# Patient Record
Sex: MALE | Race: WHITE | NOT HISPANIC OR LATINO | Employment: FULL TIME | URBAN - NONMETROPOLITAN AREA
[De-identification: names, ages, dates, MRNs, and addresses within clinical notes are randomized per-mention and may not be internally consistent; named-entity substitution may affect disease eponyms.]

---

## 2017-03-03 ENCOUNTER — HOSPITAL ENCOUNTER (EMERGENCY)
Facility: HOSPITAL | Age: 27
Discharge: HOME | End: 2017-03-03
Attending: EMERGENCY MEDICINE | Admitting: EMERGENCY MEDICINE

## 2017-03-03 ENCOUNTER — APPOINTMENT (OUTPATIENT)
Dept: ULTRASOUND IMAGING | Facility: HOSPITAL | Age: 27
End: 2017-03-03

## 2017-03-03 VITALS
SYSTOLIC BLOOD PRESSURE: 130 MMHG | HEIGHT: 70 IN | HEART RATE: 61 BPM | BODY MASS INDEX: 27.92 KG/M2 | WEIGHT: 195 LBS | OXYGEN SATURATION: 98 % | RESPIRATION RATE: 18 BRPM | TEMPERATURE: 97.3 F | DIASTOLIC BLOOD PRESSURE: 82 MMHG

## 2017-03-03 DIAGNOSIS — M67.452 GANGLION CYST OF LEFT GROIN: Primary | ICD-10-CM

## 2017-03-03 LAB
ANION GAP SERPL CALCULATED.3IONS-SCNC: 1.6 MMOL/L (ref 3.6–11.2)
BASOPHILS # BLD AUTO: 0.01 10*3/MM3 (ref 0–0.3)
BASOPHILS NFR BLD AUTO: 0.2 % (ref 0–2)
BILIRUB UR QL STRIP: NEGATIVE
BUN BLD-MCNC: 12 MG/DL (ref 7–21)
BUN/CREAT SERPL: 12.1 (ref 7–25)
C TRACH RRNA CVX QL NAA+PROBE: NOT DETECTED
CALCIUM SPEC-SCNC: 10 MG/DL (ref 7.7–10)
CHLORIDE SERPL-SCNC: 106 MMOL/L (ref 99–112)
CLARITY UR: CLEAR
CO2 SERPL-SCNC: 34.4 MMOL/L (ref 24.3–31.9)
COLOR UR: YELLOW
CREAT BLD-MCNC: 0.99 MG/DL (ref 0.43–1.29)
DEPRECATED RDW RBC AUTO: 39.8 FL (ref 37–54)
EOSINOPHIL # BLD AUTO: 0.16 10*3/MM3 (ref 0–0.7)
EOSINOPHIL NFR BLD AUTO: 2.8 % (ref 0–5)
ERYTHROCYTE [DISTWIDTH] IN BLOOD BY AUTOMATED COUNT: 12.5 % (ref 11.5–14.5)
GFR SERPL CREATININE-BSD FRML MDRD: 93 ML/MIN/1.73
GLUCOSE BLD-MCNC: 106 MG/DL (ref 70–110)
GLUCOSE UR STRIP-MCNC: NEGATIVE MG/DL
HCT VFR BLD AUTO: 45.7 % (ref 42–52)
HGB BLD-MCNC: 16 G/DL (ref 14–18)
HGB UR QL STRIP.AUTO: NEGATIVE
IMM GRANULOCYTES # BLD: 0.01 10*3/MM3 (ref 0–0.03)
IMM GRANULOCYTES NFR BLD: 0.2 % (ref 0–0.5)
KETONES UR QL STRIP: NEGATIVE
LEUKOCYTE ESTERASE UR QL STRIP.AUTO: NEGATIVE
LYMPHOCYTES # BLD AUTO: 2 10*3/MM3 (ref 1–3)
LYMPHOCYTES NFR BLD AUTO: 35.5 % (ref 21–51)
MCH RBC QN AUTO: 30.6 PG (ref 27–33)
MCHC RBC AUTO-ENTMCNC: 35 G/DL (ref 33–37)
MCV RBC AUTO: 87.4 FL (ref 80–94)
MONOCYTES # BLD AUTO: 0.1 10*3/MM3 (ref 0.1–0.9)
MONOCYTES NFR BLD AUTO: 1.8 % (ref 0–10)
N GONORRHOEA RRNA SPEC QL NAA+PROBE: NOT DETECTED
NEUTROPHILS # BLD AUTO: 3.35 10*3/MM3 (ref 1.4–6.5)
NEUTROPHILS NFR BLD AUTO: 59.5 % (ref 30–70)
NITRITE UR QL STRIP: NEGATIVE
OSMOLALITY SERPL CALC.SUM OF ELEC: 283.3 MOSM/KG (ref 273–305)
PH UR STRIP.AUTO: 7 [PH] (ref 5–8)
PLATELET # BLD AUTO: 144 10*3/MM3 (ref 130–400)
PMV BLD AUTO: 13.1 FL (ref 6–10)
POTASSIUM BLD-SCNC: 4.1 MMOL/L (ref 3.5–5.3)
PROT UR QL STRIP: NEGATIVE
RBC # BLD AUTO: 5.23 10*6/MM3 (ref 4.7–6.1)
SODIUM BLD-SCNC: 142 MMOL/L (ref 135–153)
SP GR UR STRIP: 1.02 (ref 1–1.03)
UROBILINOGEN UR QL STRIP: NORMAL
WBC NRBC COR # BLD: 5.63 10*3/MM3 (ref 4.5–12.5)

## 2017-03-03 PROCEDURE — 76870 US EXAM SCROTUM: CPT | Performed by: RADIOLOGY

## 2017-03-03 PROCEDURE — 81003 URINALYSIS AUTO W/O SCOPE: CPT | Performed by: PHYSICIAN ASSISTANT

## 2017-03-03 PROCEDURE — 36415 COLL VENOUS BLD VENIPUNCTURE: CPT

## 2017-03-03 PROCEDURE — 99283 EMERGENCY DEPT VISIT LOW MDM: CPT

## 2017-03-03 PROCEDURE — 85025 COMPLETE CBC W/AUTO DIFF WBC: CPT | Performed by: PHYSICIAN ASSISTANT

## 2017-03-03 PROCEDURE — 76870 US EXAM SCROTUM: CPT

## 2017-03-03 PROCEDURE — 87800 DETECT AGNT MULT DNA DIREC: CPT | Performed by: PHYSICIAN ASSISTANT

## 2017-03-03 PROCEDURE — 80048 BASIC METABOLIC PNL TOTAL CA: CPT | Performed by: PHYSICIAN ASSISTANT

## 2017-03-03 RX ORDER — IBUPROFEN 800 MG/1
800 TABLET ORAL EVERY 6 HOURS PRN
Qty: 30 TABLET | Refills: 0 | Status: ON HOLD | OUTPATIENT
Start: 2017-03-03 | End: 2022-06-11

## 2017-03-03 NOTE — ED PROVIDER NOTES
Subjective   HPI Comments: Pt states he has had a knot in left groin area for about 7-8 months now. Saw his PCP when he first noticed it and they advised it was most likely an ingrown hair. States that over the past few days, it has increased in size and has become more painful. Denies any scrotal swelling or pain, no penile discharge. Is in monogamous relationship with wife, no concerns for STD exposure.     Patient is a 26 y.o. male presenting with male genitourinary complaint.   History provided by:  Patient   used: No    Male  Problem   Presenting symptoms: no dysuria    Presenting symptoms comment:  Knot in left side of groin  Context: spontaneously    Relieved by:  None tried  Worsened by:  Nothing  Ineffective treatments:  None tried  Associated symptoms: groin pain    Associated symptoms: no diarrhea, no fever, no flank pain, no hematuria, no nausea, no penile redness, no penile swelling, no scrotal swelling and no vomiting    Risk factors: does not have multiple sexual partners, no new sexual partner and no STI exposure        Review of Systems   Constitutional: Negative for activity change and fever.   HENT: Negative for congestion and sore throat.    Eyes: Negative for pain.   Respiratory: Negative for cough, shortness of breath and wheezing.    Cardiovascular: Negative for chest pain.   Gastrointestinal: Negative for abdominal distention, diarrhea, nausea and vomiting.   Genitourinary: Negative for difficulty urinating, dysuria, flank pain, hematuria, penile swelling and scrotal swelling.   Musculoskeletal: Negative for arthralgias and myalgias.   Skin: Negative for rash and wound.   Neurological: Negative for dizziness and headaches.   Psychiatric/Behavioral: Negative for agitation.   All other systems reviewed and are negative.      History reviewed. No pertinent past medical history.    No Known Allergies    Past Surgical History   Procedure Laterality Date   • Cardiac ablation      • Cholecystectomy         History reviewed. No pertinent family history.    Social History     Social History   • Marital status:      Spouse name: N/A   • Number of children: N/A   • Years of education: N/A     Social History Main Topics   • Smoking status: Never Smoker   • Smokeless tobacco: None   • Alcohol use No   • Drug use: No   • Sexual activity: Not Asked     Other Topics Concern   • None     Social History Narrative   • None           Objective   Physical Exam   Constitutional: He is oriented to person, place, and time. He appears well-developed and well-nourished.   HENT:   Head: Normocephalic and atraumatic.   Eyes: EOM are normal. Pupils are equal, round, and reactive to light.   Neck: Normal range of motion. Neck supple.   Cardiovascular: Normal rate, regular rhythm and normal heart sounds.    Pulmonary/Chest: Effort normal and breath sounds normal.   Abdominal: Soft. Bowel sounds are normal.   Genitourinary: Testes normal.   Genitourinary Comments: Small, movable, knot in left groin.    Musculoskeletal: Normal range of motion.   Neurological: He is alert and oriented to person, place, and time.   Skin: Skin is warm and dry.   Psychiatric: He has a normal mood and affect. His behavior is normal. Judgment and thought content normal.   Nursing note and vitals reviewed.      Procedures         ED Course  ED Course                  MDM  Number of Diagnoses or Management Options     Amount and/or Complexity of Data Reviewed  Clinical lab tests: ordered and reviewed  Tests in the radiology section of CPT®: ordered and reviewed  Tests in the medicine section of CPT®: reviewed and ordered    Patient Progress  Patient progress: stable      Final diagnoses:   Ganglion cyst of left groin            TIO Alexander  03/03/17 6223

## 2017-03-05 ENCOUNTER — HOSPITAL ENCOUNTER (EMERGENCY)
Facility: HOSPITAL | Age: 27
Discharge: HOME | End: 2017-03-05
Attending: EMERGENCY MEDICINE | Admitting: EMERGENCY MEDICINE

## 2017-03-05 ENCOUNTER — APPOINTMENT (OUTPATIENT)
Dept: ULTRASOUND IMAGING | Facility: HOSPITAL | Age: 27
End: 2017-03-05

## 2017-03-05 VITALS
SYSTOLIC BLOOD PRESSURE: 110 MMHG | RESPIRATION RATE: 16 BRPM | TEMPERATURE: 97.8 F | DIASTOLIC BLOOD PRESSURE: 77 MMHG | WEIGHT: 195 LBS | HEIGHT: 70 IN | OXYGEN SATURATION: 98 % | HEART RATE: 80 BPM | BODY MASS INDEX: 27.92 KG/M2

## 2017-03-05 DIAGNOSIS — L02.214 INGUINAL ABSCESS: Primary | ICD-10-CM

## 2017-03-05 PROCEDURE — 76870 US EXAM SCROTUM: CPT | Performed by: RADIOLOGY

## 2017-03-05 PROCEDURE — 99283 EMERGENCY DEPT VISIT LOW MDM: CPT

## 2017-03-05 PROCEDURE — 76870 US EXAM SCROTUM: CPT

## 2017-03-05 RX ORDER — IBUPROFEN 800 MG/1
800 TABLET ORAL EVERY 8 HOURS PRN
Qty: 30 TABLET | Refills: 0 | Status: ON HOLD | OUTPATIENT
Start: 2017-03-05 | End: 2022-06-11

## 2017-03-05 RX ORDER — SULFAMETHOXAZOLE AND TRIMETHOPRIM 800; 160 MG/1; MG/1
1 TABLET ORAL EVERY 12 HOURS SCHEDULED
Status: DISCONTINUED | OUTPATIENT
Start: 2017-03-05 | End: 2017-03-05 | Stop reason: HOSPADM

## 2017-03-05 RX ORDER — SULFAMETHOXAZOLE AND TRIMETHOPRIM 800; 160 MG/1; MG/1
1 TABLET ORAL ONCE
Status: COMPLETED | OUTPATIENT
Start: 2017-03-05 | End: 2017-03-05

## 2017-03-05 RX ORDER — IBUPROFEN 400 MG/1
800 TABLET ORAL ONCE
Status: COMPLETED | OUTPATIENT
Start: 2017-03-05 | End: 2017-03-05

## 2017-03-05 RX ORDER — SULFAMETHOXAZOLE AND TRIMETHOPRIM 800; 160 MG/1; MG/1
1 TABLET ORAL 2 TIMES DAILY
Qty: 20 TABLET | Refills: 0 | Status: ON HOLD | OUTPATIENT
Start: 2017-03-05 | End: 2022-06-11

## 2017-03-05 RX ADMIN — IBUPROFEN 800 MG: 400 TABLET ORAL at 17:27

## 2017-03-05 RX ADMIN — SULFAMETHOXAZOLE AND TRIMETHOPRIM 160 MG: 800; 160 TABLET ORAL at 17:24

## 2017-03-05 RX ADMIN — SULFAMETHOXAZOLE AND TRIMETHOPRIM 160 MG: 800; 160 TABLET ORAL at 17:27

## 2017-03-05 NOTE — ED PROVIDER NOTES
Subjective   Patient is a 26 y.o. male presenting with abscess.   History provided by:  Patient  Abscess   Location:  Pelvis  Pelvic abscess location:  Groin  Abscess quality: painful    Red streaking: no    Duration:  5 days  Progression:  Worsening  Pain details:     Quality:  Throbbing    Severity:  Mild    Duration:  5 days    Timing:  Constant    Progression:  Worsening  Chronicity:  New  Relieved by:  Nothing  Ineffective treatments:  None tried  Associated symptoms: no fever        Review of Systems   Constitutional: Negative.  Negative for fever.   HENT: Negative.    Respiratory: Negative.    Cardiovascular: Negative.  Negative for chest pain.   Gastrointestinal: Negative.  Negative for abdominal pain.   Endocrine: Negative.    Genitourinary: Negative.  Negative for dysuria.   Skin: Negative.    Neurological: Negative.    Psychiatric/Behavioral: Negative.    All other systems reviewed and are negative.      History reviewed. No pertinent past medical history.    No Known Allergies    Past Surgical History   Procedure Laterality Date   • Cardiac ablation     • Cholecystectomy         History reviewed. No pertinent family history.    Social History     Social History   • Marital status:      Spouse name: N/A   • Number of children: N/A   • Years of education: N/A     Social History Main Topics   • Smoking status: Never Smoker   • Smokeless tobacco: None   • Alcohol use No   • Drug use: No   • Sexual activity: Not Asked     Other Topics Concern   • None     Social History Narrative           Objective   Physical Exam   Constitutional: He is oriented to person, place, and time. He appears well-developed and well-nourished. No distress.   HENT:   Head: Normocephalic and atraumatic.   Nose: Nose normal.   Eyes: Conjunctivae and EOM are normal. Pupils are equal, round, and reactive to light.   Neck: Normal range of motion. Neck supple. No JVD present. No tracheal deviation present.   Cardiovascular: Normal  rate, regular rhythm and normal heart sounds.    No murmur heard.  Pulmonary/Chest: Effort normal and breath sounds normal. No respiratory distress. He has no wheezes.   Abdominal: Soft. Bowel sounds are normal. There is no tenderness.   Musculoskeletal: Normal range of motion. He exhibits no edema or deformity.   Neurological: He is alert and oriented to person, place, and time. No cranial nerve deficit.   Skin: Skin is warm and dry. No rash noted. He is not diaphoretic. No erythema. No pallor.   Left inguinal mass.  Very little erythema.  Painful to palpation.    Psychiatric: He has a normal mood and affect. His behavior is normal. Thought content normal.   Nursing note and vitals reviewed.      Procedures         ED Course  ED Course   Comment By Time   US Scrotum VRAD:  Concern for possible necrotic mass or abscess left medial thigh, No acute scrotal abnormality.  TIO Harvey 03/05 1653                  MDM  Number of Diagnoses or Management Options  Inguinal abscess: minor     Amount and/or Complexity of Data Reviewed  Tests in the radiology section of CPT®: ordered and reviewed    Risk of Complications, Morbidity, and/or Mortality  Presenting problems: low  Diagnostic procedures: low  Management options: low    Patient Progress  Patient progress: stable      Final diagnoses:   Inguinal abscess            TIO Harvey  03/05/17 2143

## 2018-03-15 ENCOUNTER — APPOINTMENT (OUTPATIENT)
Dept: GENERAL RADIOLOGY | Facility: HOSPITAL | Age: 28
End: 2018-03-15

## 2018-03-15 ENCOUNTER — HOSPITAL ENCOUNTER (EMERGENCY)
Facility: HOSPITAL | Age: 28
Discharge: HOME | End: 2018-03-15
Attending: EMERGENCY MEDICINE | Admitting: EMERGENCY MEDICINE

## 2018-03-15 VITALS
HEIGHT: 70 IN | WEIGHT: 220 LBS | RESPIRATION RATE: 18 BRPM | SYSTOLIC BLOOD PRESSURE: 111 MMHG | DIASTOLIC BLOOD PRESSURE: 54 MMHG | TEMPERATURE: 99 F | HEART RATE: 79 BPM | OXYGEN SATURATION: 99 % | BODY MASS INDEX: 31.5 KG/M2

## 2018-03-15 DIAGNOSIS — S50.312A ABRASION OF LEFT ELBOW, INITIAL ENCOUNTER: Primary | ICD-10-CM

## 2018-03-15 LAB
6-ACETYL MORPHINE: NEGATIVE
ALBUMIN SERPL-MCNC: 4.3 G/DL (ref 3.5–5)
ALBUMIN/GLOB SERPL: 1.7 G/DL (ref 1.5–2.5)
ALP SERPL-CCNC: 65 U/L (ref 40–129)
ALT SERPL W P-5'-P-CCNC: 24 U/L (ref 10–44)
AMPHET+METHAMPHET UR QL: NEGATIVE
ANION GAP SERPL CALCULATED.3IONS-SCNC: 5.6 MMOL/L (ref 3.6–11.2)
AST SERPL-CCNC: 22 U/L (ref 10–34)
BARBITURATES UR QL SCN: NEGATIVE
BASOPHILS # BLD AUTO: 0.01 10*3/MM3 (ref 0–0.3)
BASOPHILS NFR BLD AUTO: 0.1 % (ref 0–2)
BENZODIAZ UR QL SCN: NEGATIVE
BILIRUB SERPL-MCNC: 1.7 MG/DL (ref 0.2–1.8)
BILIRUB UR QL STRIP: NEGATIVE
BUN BLD-MCNC: 10 MG/DL (ref 7–21)
BUN/CREAT SERPL: 10.2 (ref 7–25)
BUPRENORPHINE SERPL-MCNC: NEGATIVE NG/ML
CALCIUM SPEC-SCNC: 9.3 MG/DL (ref 7.7–10)
CANNABINOIDS SERPL QL: NEGATIVE
CHLORIDE SERPL-SCNC: 103 MMOL/L (ref 99–112)
CLARITY UR: CLEAR
CO2 SERPL-SCNC: 30.4 MMOL/L (ref 24.3–31.9)
COCAINE UR QL: NEGATIVE
COLOR UR: YELLOW
CREAT BLD-MCNC: 0.98 MG/DL (ref 0.43–1.29)
CRP SERPL-MCNC: 1.5 MG/DL (ref 0–0.99)
DEPRECATED RDW RBC AUTO: 39.6 FL (ref 37–54)
EOSINOPHIL # BLD AUTO: 0.1 10*3/MM3 (ref 0–0.7)
EOSINOPHIL NFR BLD AUTO: 1.1 % (ref 0–5)
ERYTHROCYTE [DISTWIDTH] IN BLOOD BY AUTOMATED COUNT: 12.8 % (ref 11.5–14.5)
ERYTHROCYTE [SEDIMENTATION RATE] IN BLOOD: 8 MM/HR (ref 0–15)
GFR SERPL CREATININE-BSD FRML MDRD: 93 ML/MIN/1.73
GLOBULIN UR ELPH-MCNC: 2.5 GM/DL
GLUCOSE BLD-MCNC: 109 MG/DL (ref 70–110)
GLUCOSE UR STRIP-MCNC: NEGATIVE MG/DL
HCT VFR BLD AUTO: 40.9 % (ref 42–52)
HGB BLD-MCNC: 14.6 G/DL (ref 14–18)
HGB UR QL STRIP.AUTO: NEGATIVE
IMM GRANULOCYTES # BLD: 0.02 10*3/MM3 (ref 0–0.03)
IMM GRANULOCYTES NFR BLD: 0.2 % (ref 0–0.5)
KETONES UR QL STRIP: NEGATIVE
LEUKOCYTE ESTERASE UR QL STRIP.AUTO: NEGATIVE
LYMPHOCYTES # BLD AUTO: 0.73 10*3/MM3 (ref 1–3)
LYMPHOCYTES NFR BLD AUTO: 7.7 % (ref 21–51)
MCH RBC QN AUTO: 31 PG (ref 27–33)
MCHC RBC AUTO-ENTMCNC: 35.7 G/DL (ref 33–37)
MCV RBC AUTO: 86.8 FL (ref 80–94)
METHADONE UR QL SCN: NEGATIVE
MONOCYTES # BLD AUTO: 0.56 10*3/MM3 (ref 0.1–0.9)
MONOCYTES NFR BLD AUTO: 5.9 % (ref 0–10)
NEUTROPHILS # BLD AUTO: 8.05 10*3/MM3 (ref 1.4–6.5)
NEUTROPHILS NFR BLD AUTO: 85 % (ref 30–70)
NITRITE UR QL STRIP: NEGATIVE
OPIATES UR QL: NEGATIVE
OSMOLALITY SERPL CALC.SUM OF ELEC: 277.2 MOSM/KG (ref 273–305)
OXYCODONE UR QL SCN: NEGATIVE
PCP UR QL SCN: NEGATIVE
PH UR STRIP.AUTO: 6.5 [PH] (ref 5–8)
PLATELET # BLD AUTO: 132 10*3/MM3 (ref 130–400)
PMV BLD AUTO: 12.1 FL (ref 6–10)
POTASSIUM BLD-SCNC: 3.7 MMOL/L (ref 3.5–5.3)
PROT SERPL-MCNC: 6.8 G/DL (ref 6–8)
PROT UR QL STRIP: NEGATIVE
RBC # BLD AUTO: 4.71 10*6/MM3 (ref 4.7–6.1)
SODIUM BLD-SCNC: 139 MMOL/L (ref 135–153)
SP GR UR STRIP: 1.02 (ref 1–1.03)
UROBILINOGEN UR QL STRIP: NORMAL
WBC NRBC COR # BLD: 9.47 10*3/MM3 (ref 4.5–12.5)

## 2018-03-15 PROCEDURE — 80053 COMPREHEN METABOLIC PANEL: CPT | Performed by: PHYSICIAN ASSISTANT

## 2018-03-15 PROCEDURE — 80307 DRUG TEST PRSMV CHEM ANLYZR: CPT | Performed by: PHYSICIAN ASSISTANT

## 2018-03-15 PROCEDURE — 87040 BLOOD CULTURE FOR BACTERIA: CPT | Performed by: PHYSICIAN ASSISTANT

## 2018-03-15 PROCEDURE — 99284 EMERGENCY DEPT VISIT MOD MDM: CPT

## 2018-03-15 PROCEDURE — 85652 RBC SED RATE AUTOMATED: CPT | Performed by: PHYSICIAN ASSISTANT

## 2018-03-15 PROCEDURE — 36415 COLL VENOUS BLD VENIPUNCTURE: CPT

## 2018-03-15 PROCEDURE — 85025 COMPLETE CBC W/AUTO DIFF WBC: CPT | Performed by: PHYSICIAN ASSISTANT

## 2018-03-15 PROCEDURE — 81003 URINALYSIS AUTO W/O SCOPE: CPT | Performed by: PHYSICIAN ASSISTANT

## 2018-03-15 PROCEDURE — 73080 X-RAY EXAM OF ELBOW: CPT | Performed by: RADIOLOGY

## 2018-03-15 PROCEDURE — 73080 X-RAY EXAM OF ELBOW: CPT

## 2018-03-15 PROCEDURE — 86140 C-REACTIVE PROTEIN: CPT | Performed by: PHYSICIAN ASSISTANT

## 2018-03-15 RX ORDER — CEPHALEXIN 500 MG/1
500 CAPSULE ORAL 2 TIMES DAILY
Qty: 14 CAPSULE | Refills: 0 | Status: SHIPPED | OUTPATIENT
Start: 2018-03-15 | End: 2018-03-15

## 2018-03-15 RX ORDER — CEPHALEXIN 500 MG/1
500 CAPSULE ORAL 2 TIMES DAILY
Qty: 14 CAPSULE | Refills: 0 | Status: SHIPPED | OUTPATIENT
Start: 2018-03-15 | End: 2018-03-22

## 2018-03-15 RX ORDER — SODIUM CHLORIDE 0.9 % (FLUSH) 0.9 %
10 SYRINGE (ML) INJECTION AS NEEDED
Status: DISCONTINUED | OUTPATIENT
Start: 2018-03-15 | End: 2018-03-15 | Stop reason: HOSPADM

## 2018-03-15 RX ADMIN — SODIUM CHLORIDE 1000 ML: 9 INJECTION, SOLUTION INTRAVENOUS at 05:27

## 2018-03-15 NOTE — ED PROVIDER NOTES
Subjective     History provided by:  Patient   used: No    Upper Extremity Issue   Location:  Elbow  Elbow location:  L elbow  Injury: yes    Time since incident:  1 week  Mechanism of injury: fall    Mechanism of injury comment:  Patient fell scraping left elbow 1 week ago. Reported this morning woke up shaking and running subjective fever.  Dislocation: no    Foreign body present:  No foreign bodies  Tetanus status:  Up to date  Prior injury to area:  No  Relieved by:  Nothing  Worsened by:  Nothing  Ineffective treatments:  None tried      Review of Systems   Constitutional: Negative.    HENT: Negative.    Eyes: Negative.    Respiratory: Negative.    Cardiovascular: Negative.    Gastrointestinal: Negative.    Endocrine: Negative.    Genitourinary: Negative.    Musculoskeletal: Negative.    Skin: Negative.    Allergic/Immunologic: Negative.    Neurological: Negative.    Hematological: Negative.    Psychiatric/Behavioral: Negative.    All other systems reviewed and are negative.      History reviewed. No pertinent past medical history.    No Known Allergies    Past Surgical History:   Procedure Laterality Date   • CARDIAC ABLATION     • CHOLECYSTECTOMY         History reviewed. No pertinent family history.    Social History     Social History   • Marital status:      Social History Main Topics   • Smoking status: Never Smoker   • Alcohol use No   • Drug use: No     Other Topics Concern   • Not on file           Objective   Physical Exam   Constitutional: He is oriented to person, place, and time. He appears well-developed and well-nourished.   HENT:   Head: Normocephalic and atraumatic.   Right Ear: External ear normal.   Left Ear: External ear normal.   Nose: Nose normal.   Mouth/Throat: Oropharynx is clear and moist.   Eyes: Conjunctivae and EOM are normal. Pupils are equal, round, and reactive to light.   Neck: Normal range of motion. Neck supple.   Cardiovascular: Normal rate, regular  rhythm, normal heart sounds and intact distal pulses.    Pulmonary/Chest: Effort normal and breath sounds normal.   Abdominal: Soft. Bowel sounds are normal.   Musculoskeletal: Normal range of motion.        Left elbow: He exhibits normal range of motion, no swelling, no effusion, no deformity and no laceration. Tenderness found.        Arms:  Neurological: He is alert and oriented to person, place, and time.   Skin: Skin is warm and dry.   Psychiatric: He has a normal mood and affect. His behavior is normal. Judgment and thought content normal.   Nursing note and vitals reviewed.      Procedures         ED Course  ED Course   Comment By Time   Discussed case with Dr. Pickard - recommends dc home with ortho f/u and keflex.  TIO Cameron 03/15 9288                  [facility]    Final diagnoses:   Abrasion of left elbow, initial encounter            TIO Cameron  03/15/18 3711

## 2018-03-15 NOTE — ED NOTES
"Patient presents to the ER this morning with patient's family due to left elbow swelling. Patient states approximately a week and a half ago he fell and obtained injury to left elbow, states that he thought it would resolve, but states he has started having swelling and running a fever within the past few days. Patient states that he woke up this morning \"shaking all over\" states that he believes he was running a fever so took 600 mg of motrin prior to coming to the ER. Patient updated on plan of care, no further needs or questions verbalized at this time. Patient is alert and oriented x4, respirations are even and unlabored, NADN, will continue to monitor.     Zoey Silveira RN  03/15/18 0549    "

## 2018-03-20 LAB
BACTERIA SPEC AEROBE CULT: NORMAL
BACTERIA SPEC AEROBE CULT: NORMAL

## 2022-06-11 ENCOUNTER — HOSPITAL ENCOUNTER (OUTPATIENT)
Facility: HOSPITAL | Age: 32
Setting detail: OBSERVATION
Discharge: HOME | End: 2022-06-12
Attending: EMERGENCY MEDICINE | Admitting: INTERNAL MEDICINE

## 2022-06-11 ENCOUNTER — APPOINTMENT (OUTPATIENT)
Dept: CARDIOLOGY | Facility: HOSPITAL | Age: 32
End: 2022-06-11

## 2022-06-11 ENCOUNTER — APPOINTMENT (OUTPATIENT)
Dept: GENERAL RADIOLOGY | Facility: HOSPITAL | Age: 32
End: 2022-06-11

## 2022-06-11 DIAGNOSIS — I21.4 NSTEMI (NON-ST ELEVATED MYOCARDIAL INFARCTION): Primary | ICD-10-CM

## 2022-06-11 PROBLEM — R53.81 OTHER MALAISE AND FATIGUE: Status: ACTIVE | Noted: 2022-06-11

## 2022-06-11 PROBLEM — R07.9 CHEST PAIN: Status: ACTIVE | Noted: 2022-06-11

## 2022-06-11 PROBLEM — R53.83 OTHER MALAISE AND FATIGUE: Status: ACTIVE | Noted: 2022-06-11

## 2022-06-11 PROBLEM — F41.1 ANXIETY STATE: Status: ACTIVE | Noted: 2022-06-11

## 2022-06-11 PROBLEM — R00.2 PALPITATIONS: Status: ACTIVE | Noted: 2022-06-11

## 2022-06-11 LAB
ALBUMIN SERPL-MCNC: 3.7 G/DL (ref 3.5–5.2)
ALBUMIN/GLOB SERPL: 1.1 G/DL
ALP SERPL-CCNC: 64 U/L (ref 39–117)
ALT SERPL W P-5'-P-CCNC: 11 U/L (ref 1–41)
ANION GAP SERPL CALCULATED.3IONS-SCNC: 10.2 MMOL/L (ref 5–15)
AST SERPL-CCNC: 25 U/L (ref 1–40)
BASOPHILS # BLD AUTO: 0.03 10*3/MM3 (ref 0–0.2)
BASOPHILS NFR BLD AUTO: 0.5 % (ref 0–1.5)
BH CV ECHO MEAS - ACS: 2.6 CM
BH CV ECHO MEAS - AO MAX PG: 7.6 MMHG
BH CV ECHO MEAS - AO MEAN PG: 4 MMHG
BH CV ECHO MEAS - AO ROOT DIAM: 3.4 CM
BH CV ECHO MEAS - AO V2 MAX: 138 CM/SEC
BH CV ECHO MEAS - AO V2 VTI: 28.5 CM
BH CV ECHO MEAS - EDV(CUBED): 125 ML
BH CV ECHO MEAS - EDV(MOD-SP4): 96.1 ML
BH CV ECHO MEAS - EF(MOD-BP): 56 %
BH CV ECHO MEAS - EF(MOD-SP4): 56.4 %
BH CV ECHO MEAS - ESV(CUBED): 39.3 ML
BH CV ECHO MEAS - ESV(MOD-SP4): 41.9 ML
BH CV ECHO MEAS - FS: 32 %
BH CV ECHO MEAS - IVS/LVPW: 0.69 CM
BH CV ECHO MEAS - IVSD: 0.9 CM
BH CV ECHO MEAS - LA DIMENSION: 2.3 CM
BH CV ECHO MEAS - LAT PEAK E' VEL: 12.9 CM/SEC
BH CV ECHO MEAS - LV DIASTOLIC VOL/BSA (35-75): 40.3 CM2
BH CV ECHO MEAS - LV MASS(C)D: 207.1 GRAMS
BH CV ECHO MEAS - LV SYSTOLIC VOL/BSA (12-30): 17.6 CM2
BH CV ECHO MEAS - LVIDD: 5 CM
BH CV ECHO MEAS - LVIDS: 3.4 CM
BH CV ECHO MEAS - LVOT AREA: 3.1 CM2
BH CV ECHO MEAS - LVOT DIAM: 2 CM
BH CV ECHO MEAS - LVPWD: 1.3 CM
BH CV ECHO MEAS - MED PEAK E' VEL: 10.1 CM/SEC
BH CV ECHO MEAS - MV A MAX VEL: 75.8 CM/SEC
BH CV ECHO MEAS - MV E MAX VEL: 79.3 CM/SEC
BH CV ECHO MEAS - MV E/A: 1.05
BH CV ECHO MEAS - PA ACC TIME: 0.13 SEC
BH CV ECHO MEAS - PA PR(ACCEL): 20.5 MMHG
BH CV ECHO MEAS - SI(MOD-SP4): 22.7 ML/M2
BH CV ECHO MEAS - SV(MOD-SP4): 54.2 ML
BH CV ECHO MEAS - TAPSE (>1.6): 2.43 CM
BH CV ECHO MEASUREMENTS AVERAGE E/E' RATIO: 6.9
BILIRUB SERPL-MCNC: 0.9 MG/DL (ref 0–1.2)
BUN SERPL-MCNC: 11 MG/DL (ref 6–20)
BUN/CREAT SERPL: 11.2 (ref 7–25)
CALCIUM SPEC-SCNC: 9 MG/DL (ref 8.6–10.5)
CHLORIDE SERPL-SCNC: 105 MMOL/L (ref 98–107)
CO2 SERPL-SCNC: 26.8 MMOL/L (ref 22–29)
CREAT SERPL-MCNC: 0.98 MG/DL (ref 0.76–1.27)
DEPRECATED RDW RBC AUTO: 40.3 FL (ref 37–54)
EGFRCR SERPLBLD CKD-EPI 2021: 106.4 ML/MIN/1.73
EOSINOPHIL # BLD AUTO: 0.07 10*3/MM3 (ref 0–0.4)
EOSINOPHIL NFR BLD AUTO: 1.2 % (ref 0.3–6.2)
ERYTHROCYTE [DISTWIDTH] IN BLOOD BY AUTOMATED COUNT: 12.8 % (ref 12.3–15.4)
FLUAV SUBTYP SPEC NAA+PROBE: NOT DETECTED
FLUBV RNA ISLT QL NAA+PROBE: NOT DETECTED
GLOBULIN UR ELPH-MCNC: 3.3 GM/DL
GLUCOSE SERPL-MCNC: 109 MG/DL (ref 65–99)
HCT VFR BLD AUTO: 40.7 % (ref 37.5–51)
HGB BLD-MCNC: 14.1 G/DL (ref 13–17.7)
HOLD SPECIMEN: NORMAL
HOLD SPECIMEN: NORMAL
IMM GRANULOCYTES # BLD AUTO: 0.03 10*3/MM3 (ref 0–0.05)
IMM GRANULOCYTES NFR BLD AUTO: 0.5 % (ref 0–0.5)
LEFT ATRIUM VOLUME INDEX: 19.3 ML/M2
LYMPHOCYTES # BLD AUTO: 2.22 10*3/MM3 (ref 0.7–3.1)
LYMPHOCYTES NFR BLD AUTO: 37.3 % (ref 19.6–45.3)
MAXIMAL PREDICTED HEART RATE: 190 BPM
MCH RBC QN AUTO: 30 PG (ref 26.6–33)
MCHC RBC AUTO-ENTMCNC: 34.6 G/DL (ref 31.5–35.7)
MCV RBC AUTO: 86.6 FL (ref 79–97)
MONOCYTES # BLD AUTO: 0.43 10*3/MM3 (ref 0.1–0.9)
MONOCYTES NFR BLD AUTO: 7.2 % (ref 5–12)
NEUTROPHILS NFR BLD AUTO: 3.17 10*3/MM3 (ref 1.7–7)
NEUTROPHILS NFR BLD AUTO: 53.3 % (ref 42.7–76)
NRBC BLD AUTO-RTO: 0 /100 WBC (ref 0–0.2)
PLATELET # BLD AUTO: 188 10*3/MM3 (ref 140–450)
PMV BLD AUTO: 11.4 FL (ref 6–12)
POTASSIUM SERPL-SCNC: 4 MMOL/L (ref 3.5–5.2)
PROT SERPL-MCNC: 7 G/DL (ref 6–8.5)
QT INTERVAL: 378 MS
QTC INTERVAL: 435 MS
RBC # BLD AUTO: 4.7 10*6/MM3 (ref 4.14–5.8)
SARS-COV-2 RNA PNL SPEC NAA+PROBE: NOT DETECTED
SODIUM SERPL-SCNC: 142 MMOL/L (ref 136–145)
STRESS TARGET HR: 162 BPM
TROPONIN T SERPL-MCNC: 0.12 NG/ML (ref 0–0.03)
TROPONIN T SERPL-MCNC: 0.16 NG/ML (ref 0–0.03)
TROPONIN T SERPL-MCNC: 0.33 NG/ML (ref 0–0.03)
WBC NRBC COR # BLD: 5.95 10*3/MM3 (ref 3.4–10.8)
WHOLE BLOOD HOLD COAG: NORMAL
WHOLE BLOOD HOLD SPECIMEN: NORMAL

## 2022-06-11 PROCEDURE — 96372 THER/PROPH/DIAG INJ SC/IM: CPT

## 2022-06-11 PROCEDURE — 87636 SARSCOV2 & INF A&B AMP PRB: CPT | Performed by: PHYSICIAN ASSISTANT

## 2022-06-11 PROCEDURE — 99204 OFFICE O/P NEW MOD 45 MIN: CPT | Performed by: INTERNAL MEDICINE

## 2022-06-11 PROCEDURE — 25010000002 ENOXAPARIN PER 10 MG: Performed by: PHYSICIAN ASSISTANT

## 2022-06-11 PROCEDURE — 84484 ASSAY OF TROPONIN QUANT: CPT | Performed by: PHYSICIAN ASSISTANT

## 2022-06-11 PROCEDURE — 36415 COLL VENOUS BLD VENIPUNCTURE: CPT

## 2022-06-11 PROCEDURE — 93306 TTE W/DOPPLER COMPLETE: CPT

## 2022-06-11 PROCEDURE — 99219 PR INITIAL OBSERVATION CARE/DAY 50 MINUTES: CPT | Performed by: PHYSICIAN ASSISTANT

## 2022-06-11 PROCEDURE — 93306 TTE W/DOPPLER COMPLETE: CPT | Performed by: INTERNAL MEDICINE

## 2022-06-11 PROCEDURE — 80053 COMPREHEN METABOLIC PANEL: CPT | Performed by: PHYSICIAN ASSISTANT

## 2022-06-11 PROCEDURE — 84484 ASSAY OF TROPONIN QUANT: CPT | Performed by: INTERNAL MEDICINE

## 2022-06-11 PROCEDURE — G0378 HOSPITAL OBSERVATION PER HR: HCPCS

## 2022-06-11 PROCEDURE — 71045 X-RAY EXAM CHEST 1 VIEW: CPT | Performed by: RADIOLOGY

## 2022-06-11 PROCEDURE — 93005 ELECTROCARDIOGRAM TRACING: CPT | Performed by: PHYSICIAN ASSISTANT

## 2022-06-11 PROCEDURE — 93010 ELECTROCARDIOGRAM REPORT: CPT | Performed by: INTERNAL MEDICINE

## 2022-06-11 PROCEDURE — C9803 HOPD COVID-19 SPEC COLLECT: HCPCS

## 2022-06-11 PROCEDURE — 85025 COMPLETE CBC W/AUTO DIFF WBC: CPT | Performed by: PHYSICIAN ASSISTANT

## 2022-06-11 PROCEDURE — 99284 EMERGENCY DEPT VISIT MOD MDM: CPT

## 2022-06-11 PROCEDURE — 71045 X-RAY EXAM CHEST 1 VIEW: CPT

## 2022-06-11 PROCEDURE — 93005 ELECTROCARDIOGRAM TRACING: CPT | Performed by: EMERGENCY MEDICINE

## 2022-06-11 RX ORDER — SODIUM CHLORIDE 0.9 % (FLUSH) 0.9 %
10 SYRINGE (ML) INJECTION AS NEEDED
Status: DISCONTINUED | OUTPATIENT
Start: 2022-06-11 | End: 2022-06-12 | Stop reason: HOSPADM

## 2022-06-11 RX ORDER — AMOXICILLIN 500 MG/1
1000 CAPSULE ORAL 2 TIMES DAILY
Status: CANCELLED | OUTPATIENT
Start: 2022-06-11 | End: 2022-06-18

## 2022-06-11 RX ORDER — ENOXAPARIN SODIUM 100 MG/ML
1 INJECTION SUBCUTANEOUS EVERY 12 HOURS
Status: DISCONTINUED | OUTPATIENT
Start: 2022-06-12 | End: 2022-06-12 | Stop reason: HOSPADM

## 2022-06-11 RX ORDER — AMOXICILLIN 500 MG/1
1000 CAPSULE ORAL 2 TIMES DAILY
COMMUNITY
Start: 2022-06-07 | End: 2022-06-17

## 2022-06-11 RX ORDER — ATORVASTATIN CALCIUM 40 MG/1
80 TABLET, FILM COATED ORAL NIGHTLY
Status: DISCONTINUED | OUTPATIENT
Start: 2022-06-11 | End: 2022-06-12 | Stop reason: HOSPADM

## 2022-06-11 RX ORDER — AMOXICILLIN 500 MG/1
1000 CAPSULE ORAL 2 TIMES DAILY
Status: DISCONTINUED | OUTPATIENT
Start: 2022-06-11 | End: 2022-06-12 | Stop reason: HOSPADM

## 2022-06-11 RX ORDER — AMOXICILLIN 500 MG/1
1000 CAPSULE ORAL 2 TIMES DAILY
Status: DISCONTINUED | OUTPATIENT
Start: 2022-06-11 | End: 2022-06-11

## 2022-06-11 RX ORDER — ONDANSETRON 2 MG/ML
4 INJECTION INTRAMUSCULAR; INTRAVENOUS EVERY 6 HOURS PRN
Status: DISCONTINUED | OUTPATIENT
Start: 2022-06-11 | End: 2022-06-12 | Stop reason: HOSPADM

## 2022-06-11 RX ORDER — ACETAMINOPHEN 325 MG/1
650 TABLET ORAL EVERY 6 HOURS PRN
Status: DISCONTINUED | OUTPATIENT
Start: 2022-06-11 | End: 2022-06-12 | Stop reason: HOSPADM

## 2022-06-11 RX ORDER — ASPIRIN 81 MG/1
324 TABLET, CHEWABLE ORAL ONCE
Status: COMPLETED | OUTPATIENT
Start: 2022-06-11 | End: 2022-06-11

## 2022-06-11 RX ORDER — ENOXAPARIN SODIUM 100 MG/ML
1 INJECTION SUBCUTANEOUS ONCE
Status: COMPLETED | OUTPATIENT
Start: 2022-06-11 | End: 2022-06-11

## 2022-06-11 RX ORDER — ASPIRIN 81 MG/1
81 TABLET ORAL DAILY
Status: DISCONTINUED | OUTPATIENT
Start: 2022-06-12 | End: 2022-06-12 | Stop reason: HOSPADM

## 2022-06-11 RX ORDER — SODIUM CHLORIDE 0.9 % (FLUSH) 0.9 %
10 SYRINGE (ML) INJECTION EVERY 12 HOURS SCHEDULED
Status: DISCONTINUED | OUTPATIENT
Start: 2022-06-11 | End: 2022-06-12 | Stop reason: HOSPADM

## 2022-06-11 RX ADMIN — ATORVASTATIN CALCIUM 80 MG: 40 TABLET, FILM COATED ORAL at 21:43

## 2022-06-11 RX ADMIN — NITROGLYCERIN 1 INCH: 20 OINTMENT TOPICAL at 21:43

## 2022-06-11 RX ADMIN — ASPIRIN 324 MG: 81 TABLET, CHEWABLE ORAL at 10:17

## 2022-06-11 RX ADMIN — ENOXAPARIN SODIUM 110 MG: 60 INJECTION SUBCUTANEOUS at 13:28

## 2022-06-11 RX ADMIN — METOPROLOL TARTRATE 12.5 MG: 25 TABLET, FILM COATED ORAL at 21:43

## 2022-06-11 RX ADMIN — AMOXICILLIN 1000 MG: 500 CAPSULE ORAL at 21:43

## 2022-06-11 RX ADMIN — ENOXAPARIN SODIUM 110 MG: 60 INJECTION SUBCUTANEOUS at 21:42

## 2022-06-11 RX ADMIN — Medication 10 ML: at 19:37

## 2022-06-11 NOTE — ED PROVIDER NOTES
Subjective   32-year-old white male presents secondary to chest discomfort.  Patient states that around 830 he was already awake and was getting ready to go to get his haircut when he started developing some discomfort between his shoulder blades.  He states this was a drawing sensation.  He subsequently developed some he thought might have been indigestion with chest discomfort.  He states he did not have any shortness of breath.  He states that he did get slightly clammy at the time.  This resolved after period of time.  He states he has a slight discomfort since then.  He denies any history of coronary blockages.  He does have a history of arrhythmia/V. tach which required an ablation approximately 7 years ago.  He states that he is not had any problems since then.  He denies any history of hypertension hyperlipidemia diabetes.  He voices no other complaints at this time.  He states that his symptoms improved after taking an antacid.          Review of Systems   Constitutional: Negative.  Negative for fever.   HENT: Negative.    Respiratory: Positive for chest tightness. Negative for shortness of breath.    Cardiovascular: Negative.  Negative for chest pain.   Gastrointestinal: Negative.  Negative for abdominal pain.   Endocrine: Negative.    Genitourinary: Negative.  Negative for dysuria.   Skin: Negative.    Neurological: Negative.    Psychiatric/Behavioral: Negative.    All other systems reviewed and are negative.      Past Medical History:   Diagnosis Date   • Anxiety state 6/11/2022   • Palpitations 6/11/2022       No Known Allergies    Past Surgical History:   Procedure Laterality Date   • CARDIAC ABLATION     • CHOLECYSTECTOMY         Family History   Problem Relation Age of Onset   • Cancer Mother         Breast CA   • Diabetes Father        Social History     Socioeconomic History   • Marital status:    Tobacco Use   • Smoking status: Never Smoker   Substance and Sexual Activity   • Alcohol use:  No   • Drug use: No           Objective   Physical Exam  Vitals and nursing note reviewed.   Constitutional:       General: He is not in acute distress.     Appearance: He is well-developed. He is not diaphoretic.   HENT:      Head: Normocephalic and atraumatic.      Right Ear: External ear normal.      Left Ear: External ear normal.      Nose: Nose normal.   Eyes:      Conjunctiva/sclera: Conjunctivae normal.      Pupils: Pupils are equal, round, and reactive to light.   Neck:      Vascular: No JVD.      Trachea: No tracheal deviation.   Cardiovascular:      Rate and Rhythm: Normal rate and regular rhythm.      Heart sounds: Normal heart sounds. No murmur heard.  Pulmonary:      Effort: Pulmonary effort is normal. No respiratory distress.      Breath sounds: Normal breath sounds. No wheezing.   Abdominal:      General: Bowel sounds are normal.      Palpations: Abdomen is soft.      Tenderness: There is no abdominal tenderness.   Musculoskeletal:         General: No deformity. Normal range of motion.      Cervical back: Normal range of motion and neck supple.   Skin:     General: Skin is warm and dry.      Coloration: Skin is not pale.      Findings: No erythema or rash.   Neurological:      Mental Status: He is alert and oriented to person, place, and time.      Cranial Nerves: No cranial nerve deficit.   Psychiatric:         Behavior: Behavior normal.         Thought Content: Thought content normal.         Procedures           ED Course  ED Course as of 06/11/22 1618   Sat Jun 11, 2022   1021 ECG 12 Lead  Normal sinus rhythm.  Rate 80.  Normal axis.  Normal QT interval.  No hypertrophic changes.  No acute ischemic changes.  Normal EKG.  Interpreted by me. [BC]   1152 Tried to admit patient however patient does not wish to be admitted at this time and is thinking about signing out AGAINST MEDICAL ADVICE.  Had lengthy discussions in regards to heart attack and death cardiomyopathy etc.  I am trying to get someone  from finances to be able to talk to him in regards to not having insurance. [JI]   1301 Still awaiting second troponin [JI]   1319 Reviewed with Dr Demetris Posadas and asa. Admit to hospitalist.  [JI]   1325 Dr Malik contacting the hospitalist. [JI]   1345 Accepted by Dr Behbahani [JI]      ED Course User Index  [BC] North Malik MD  [JI] Dex Stack PA                                                 MDM  Number of Diagnoses or Management Options  NSTEMI (non-ST elevated myocardial infarction) (HCC): new and requires workup     Amount and/or Complexity of Data Reviewed  Clinical lab tests: reviewed and ordered  Tests in the radiology section of CPT®: ordered and reviewed  Tests in the medicine section of CPT®: reviewed and ordered  Discuss the patient with other providers: yes    Risk of Complications, Morbidity, and/or Mortality  Presenting problems: high  Diagnostic procedures: high  Management options: high        Final diagnoses:   NSTEMI (non-ST elevated myocardial infarction) (HCC)       ED Disposition  ED Disposition     ED Disposition   Decision to Admit    Condition   --    Comment   Level of Care: Telemetry [5]   Diagnosis: Chest pain [314529]               No follow-up provider specified.       Medication List      No changes were made to your prescriptions during this visit.          Dex Stack PA  06/11/22 8526

## 2022-06-11 NOTE — PROGRESS NOTES
Discharge Planning Assessment   [facility]     Patient Name: Gaston Hanna  MRN: [id number]  Today's Date: 6/11/2022    Admit Date: 6/11/2022     Discharge Needs Assessment     Row Name 06/11/22 1349       Living Environment    People in Home spouse    Current Living Arrangements home    Primary Care Provided by self    Family Caregiver if Needed spouse    Quality of Family Relationships helpful;involved;supportive       Resource/Environmental Concerns    Resource/Environmental Concerns financial    Financial Concerns insurance, none       Transition Planning    Patient/Family Anticipates Transition to home with family    Patient/Family Anticipated Services at Transition none    Transportation Anticipated car, drives self       Discharge Needs Assessment    Readmission Within the Last 30 Days no previous admission in last 30 days    Equipment Currently Used at Home none    Concerns to be Addressed financial/insurance;discharge planning    Anticipated Changes Related to Illness none    Equipment Needed After Discharge none               Discharge Plan     Row Name 06/11/22 1349       Plan    Plan Pt lives at home with spouse Tracy and plans to return home at discharge, pt drives himself. Pt states he does not currently have a pcp, he uses [facility] and does not have insurance. Registration was asked to speak to pt about options. Pt is independent he does not use any dme, home health or home o2.    Patient/Family in Agreement with Plan yes              Continued Care and Services - Admitted Since 6/11/2022    Coordination has not been started for this encounter.          Demographic Summary     Row Name 06/11/22 1349       General Information    Admission Type observation    Arrived From home    Referral Source emergency department    Reason for Consult discharge planning               Functional Status     Row Name 06/11/22 1349       Functional Status    Usual Activity Tolerance good    Current  Activity Tolerance good               Psychosocial    No documentation.                Abuse/Neglect    No documentation.                Legal    No documentation.                Substance Abuse    No documentation.                Patient Forms    No documentation.                   Irais Barros RN

## 2022-06-11 NOTE — PLAN OF CARE
Goal Outcome Evaluation:  Plan of Care Reviewed With: patient           Outcome Evaluation: Pt is a new admit from the ER, report recieved from [facility]. AOx4, VSS, no complants or s/s of acute distress noted. Pt educated on need for tests today and in the AM, pt verbalizes understanding. IV access and telemetry monitoring patent and maintained, will continue to monitor.

## 2022-06-11 NOTE — H&P
[facility] Medicine Services  History & Physical    Patient Identification:  Name:  Gaston Hanna  Age:  32 y.o.  Sex:  male  :  1990  MRN:  [id number]   Visit Number:  [id number]  Admit Date: 2022   Primary Care Physician:  Provider, No Known    Subjective     Chief complaint: Chest discomfort    History of presenting illness:      Gaston Hanna is a 32 y.o. male with past medical history significant for cardiac ablation due to prior arrhythmia/Vtach.     Mr. Hanna presented to the ED of [facility] on this date for further evaluation of chest discomfort reporting chest/back shoulder pain since this AM with cold sweats and chest tightness. Upon arrival to the ED, vital signs were T 97.8F, pulse 74, RR 16, and BP was 134/91 with EKG with NSR. Initial troponin T was elevated at 0.115 with repeat found to be 0.164.        Mr. Hanna reported onset of chest discomfort to be around 830 AM this morning with discomfort between his shoulder blades and development of what he thought was indigestin.   He reported clamminess during period of chest pressure that was relieved as the pain resolved. He reports symptoms resolved with antacid therapy.      Mr. Hanna reports he was evaluated on 22 at [facility] for sore throat.  He reports he was swabbed for strep and found to be positive.  He started Amoxicillin on said date and has been taking it for a total of 5 days.  He reports chest pain did not start until this AM.  He denies dyspnea, fever, and chills.  He reports sore throat has improved. He denies nausea, vomiting, chills, fatigue.      He denies known significant family history of cardiovascular disease as well as known personal history of cardiovascular disease.         ---------------------------------------------------------------------------------------------------------------------   Review of Systems   Constitutional: Positive for fatigue. Negative for  activity change.   HENT: Negative for congestion and drooling.    Eyes: Negative for pain and discharge.   Respiratory: Negative for cough, shortness of breath and wheezing.    Cardiovascular: Positive for chest pain. Negative for palpitations and leg swelling.   Gastrointestinal: Negative for abdominal distention, diarrhea, rectal pain and vomiting.   Endocrine: Negative for cold intolerance.   Genitourinary: Negative for difficulty urinating and enuresis.   Musculoskeletal: Negative for arthralgias and gait problem.   Skin: Negative for color change and pallor.   Allergic/Immunologic: Negative for environmental allergies and food allergies.   Neurological: Negative for dizziness and headaches.   Hematological: Negative for adenopathy. Does not bruise/bleed easily.   Psychiatric/Behavioral: Negative for agitation and confusion.        ---------------------------------------------------------------------------------------------------------------------   Past Medical History:   Diagnosis Date   • Anxiety state 6/11/2022   • Palpitations 6/11/2022     Past Surgical History:   Procedure Laterality Date   • CARDIAC ABLATION     • CHOLECYSTECTOMY       Family History   Problem Relation Age of Onset   • Cancer Mother         Breast CA   • Diabetes Father      Social History     Socioeconomic History   • Marital status:    Tobacco Use   • Smoking status: Never Smoker   Substance and Sexual Activity   • Alcohol use: No   • Drug use: No     ---------------------------------------------------------------------------------------------------------------------   Allergies:  Patient has no known allergies.  ---------------------------------------------------------------------------------------------------------------------   Home medications:    Medications below are reported home medications pulling from within the system; at this time, these medications have not been reconciled unless otherwise specified and are in the  verification process for further verifcation as current home medications.  (Not in a hospital admission)      Hospital Scheduled Meds:          Current listed hospital scheduled medications may not yet reflect those currently placed in orders that are signed and held awaiting patient's arrival to floor.   ---------------------------------------------------------------------------------------------------------------------     Objective     Vital Signs:  Temp:  [97.8 °F (36.6 °C)] 97.8 °F (36.6 °C)  Heart Rate:  [65-75] 70  Resp:  [16] 16  BP: (131-144)/() 139/113      06/11/22  1005   Weight: 113 kg (250 lb)     Body mass index is 34.87 kg/m².  ---------------------------------------------------------------------------------------------------------------------       Physical Exam  Vitals and nursing note reviewed.   Constitutional:       General: He is awake.      Appearance: Normal appearance. He is well-developed.   HENT:      Head: Normocephalic and atraumatic.      Mouth/Throat:      Mouth: Mucous membranes are moist.   Eyes:      General: Lids are normal.      Conjunctiva/sclera:      Right eye: Right conjunctiva is not injected.      Left eye: Left conjunctiva is not injected.   Cardiovascular:      Rate and Rhythm: Normal rate and regular rhythm.      Heart sounds: S1 normal and S2 normal.   Pulmonary:      Effort: No tachypnea or bradypnea.      Breath sounds: No decreased breath sounds, wheezing, rhonchi or rales.   Abdominal:      General: Bowel sounds are normal.      Palpations: Abdomen is soft.      Tenderness: There is no abdominal tenderness.   Musculoskeletal:      Right lower leg: No swelling. No edema.      Left lower leg: No swelling. No edema.   Skin:     General: Skin is warm and moist.   Neurological:      Mental Status: He is alert and oriented to person, place, and time.      GCS: GCS eye subscore is 4. GCS verbal subscore is 5. GCS motor subscore is 6.   Psychiatric:         Attention  and Perception: Attention normal.         Mood and Affect: Mood normal.         Speech: Speech normal.         Behavior: Behavior is cooperative.               ---------------------------------------------------------------------------------------------------------------------  EKG:                        I have personally looked at both the EKG and the telemetry strips.  ---------------------------------------------------------------------------------------------------------------------   Results from last 7 days   Lab Units 06/11/22  1023   WBC 10*3/mm3 5.95   HEMOGLOBIN g/dL 14.1   HEMATOCRIT % 40.7   MCV fL 86.6   MCHC g/dL 34.6   PLATELETS 10*3/mm3 188         Results from last 7 days   Lab Units 06/11/22  1023   SODIUM mmol/L 142   POTASSIUM mmol/L 4.0   CHLORIDE mmol/L 105   CO2 mmol/L 26.8   BUN mg/dL 11   CREATININE mg/dL 0.98   CALCIUM mg/dL 9.0   GLUCOSE mg/dL 109*   ALBUMIN g/dL 3.70   BILIRUBIN mg/dL 0.9   ALK PHOS U/L 64   AST (SGOT) U/L 25   ALT (SGPT) U/L 11   Estimated Creatinine Clearance: 140.9 mL/min (by C-G formula based on SCr of 0.98 mg/dL).  No results found for: AMMONIA  Results from last 7 days   Lab Units 06/11/22  1221 06/11/22  1023   TROPONIN T ng/mL 0.164* 0.115*         Lab Results   Component Value Date    HGBA1C 4.5 11/24/2015     Lab Results   Component Value Date    TSH 1.812 10/14/2015     No results found for: PREGTESTUR, PREGSERUM, HCG, HCGQUANT  Pain Management Panel     Pain Management Panel Latest Ref Rng & Units 3/15/2018 10/12/2015    AMPHETAMINES SCREEN, URINE Negative Negative Negative    BARBITURATES SCREEN Negative Negative Negative    BENZODIAZEPINE SCREEN, URINE Negative Negative Negative    BUPRENORPHINEUR Negative Negative -    COCAINE SCREEN, URINE Negative Negative Negative    METHADONE SCREEN, URINE Negative Negative Negative        No results found for: BLOODCX  No results found for: URINECX  No results found for: WOUNDCX  No results found for: STOOLCX       ---------------------------------------------------------------------------------------------------------------------  Imaging Results (Last 7 Days)     Procedure Component Value Units Date/Time    XR Chest 1 View [[id number]] Collected: 06/11/22 1342     Updated: 06/11/22 1356    Narrative:      XR CHEST 1 VW-     CLINICAL INDICATION: chest pain        COMPARISON: 02/29/2016      TECHNIQUE: Single frontal view of the chest.     FINDINGS:      LUNGS: Lungs are adequately aerated.      HEART AND MEDIASTINUM: Heart and mediastinal contours are unremarkable        SKELETON: Bony and soft tissue structures are unremarkable.             Impression:      No radiographic evidence of acute cardiac or pulmonary disease.     This report was finalized on 6/11/2022 1:42 PM by Dr. Anival Alexandra MD.             Cultures:  No results found for: BLOODCX, URINECX, WOUNDCX, MRSACX, RESPCX, STOOLCX    Last echocardiogram:          I have personally reviewed the above radiology images and read the final radiology report on 06/11/22  ---------------------------------------------------------------------------------------------------------------------  Assessment / Plan     Active Hospital Problems    Diagnosis  POA   • Chest pain [R07.9]  Yes       ASSESSMENT/PLAN:    -Chest pain r/o MI:   -Elevated Troponin T:   -Concern for myocarditis with recently diagnosed Strep pharyngitis:    • Continuous cardiac monitoring.   • Echocardiogram has been ordered for further structural evaluation.   • Consult cardiology for further evaluation with intial SQ Lovenox dosed in the ED.   • Full dose Lovenox per ED discussion with Cardiology.   • Aspirin on board.   • Order AM EKG.   • Order request placed record from [facility] visit from earlier this week and recent positive strep swab.   • Troponin T q6 ordered x3.     -Recently diagnosed Strep pharyngitis:   · Strep swab record requested.   · Review home medication regimen.   · Today is day #5  of amoxicillin.     -Hx cardiac arrhythmia s/p ablation:   · Continuous cardiac monitoring.     -Obesity by BMi 34.87 kg/m2:   · Supportive care.     ----------  -DVT prophylaxis: SQ Lovenox to serve  -Activity: Up with assist  -Expected length of stay:   OBSERVATION status; however, if further evaluation or treatment plans warrant, status may change.  Based upon current information, I predict patient's care encounter to be less than or equal to 2 midnights  -Disposition: Return home at discharge    High risk secondary to chest pain with elevated Troponin T and concern for possible Strep pharyngitis induced myocarditis        Ijeoma Perez PA-C  06/11/22  14:25 EDT  Pager # [phone]  ---------------------------------------------------------------------------------------------------------------------

## 2022-06-11 NOTE — CONSULTS
Consults  Date of Admit: 6/11/2022  Date of Consult: 06/11/22  No ref. provider found  Gaston Hanna  1990  Consulting Physician: Dr. Broderick    Cardiology consultation    Reason for consultation: elevated troponin  Assessment:  1. Chest pain elevated cardiac enzymes  2. History of ventricular tachycardia s/p ablation  3. Acute strep pharyngitis      Recommendations:  1. Cardiac.  Patient has been having some discomfort in the chest off and on sometimes he feels his heart doing a flip-flop in the chest.  Patient came in due to worsening anginal symptoms.  He had recent diagnosis of strep throat and is taking antibiotics also.  2. Patient troponins were minimally elevated.  EKG does not show any acute ischemic changes.  Will get echocardiogram for any new wall motion abnormality.  Need to rule out any myocardial involvement from viral disease.  3. May get a stress test on him.  Anticoagulation will be advised beta-blockers will be advised lipids will be checked.  Aggressive risk factor modification coronary artery disease to continue.  Thank you for the consultation.      History of Present Illness    Subjective     Chief Complaint   Patient presents with   • Chest Pain       Gaston Hanna is a 32 y.o. male with past medical history significant for history of nonsustained ventricular tachycardia status post ablation by Dr. Oliveros on 10/5/2015 and tobacco abuse.  He does deny history of diabetes mellitus.  Gaston reports that he presented to [facility] emergency department after developing chest pain this morning around 8:00 that did radiate into his shoulder.  That he described as a sharp sensation that lasted for a few minutes at a time.  Of note he was recently diagnosed with acute streptococcal pharyngitis just a few days ago and was initiated on antibiotics.  Upon arrival in the emergency room he was found to have an elevated troponin initially at 0.115 with redraw 2 hours  later being 0.164.  He denies any known family history of premature coronary artery disease.  He also reports he does still feel occasional episodes of palpitations that he describes as PVCs.          Past Medical History:   Diagnosis Date   • Anxiety state 6/11/2022   • Palpitations 6/11/2022     Past Surgical History:   Procedure Laterality Date   • CARDIAC ABLATION     • CHOLECYSTECTOMY       Family History   Problem Relation Age of Onset   • Cancer Mother         Breast CA   • Diabetes Father      Social History     Tobacco Use   • Smoking status: Never Smoker   Substance Use Topics   • Alcohol use: No   • Drug use: No     (Not in a hospital admission)    Allergies:  Patient has no known allergies.      Current Facility-Administered Medications:   •  sodium chloride 0.9 % flush 10 mL, 10 mL, Intravenous, PRN, Dex Stack PA    Current Outpatient Medications:   •  ibuprofen (ADVIL,MOTRIN) 800 MG tablet, Take 1 tablet by mouth Every 6 (Six) Hours As Needed for mild pain (1-3) or moderate pain (4-6)., Disp: 30 tablet, Rfl: 0  •  ibuprofen (ADVIL,MOTRIN) 800 MG tablet, Take 1 tablet by mouth Every 8 (Eight) Hours As Needed for mild pain (1-3)., Disp: 30 tablet, Rfl: 0  •  sulfamethoxazole-trimethoprim (BACTRIM DS,SEPTRA DS) 800-160 MG per tablet, Take 1 tablet by mouth 2 (Two) Times a Day., Disp: 20 tablet, Rfl: 0    Review of Systems   Constitutional: Negative for appetite change and diaphoresis.   Respiratory: Positive for chest tightness and shortness of breath.    Cardiovascular: Positive for chest pain and palpitations.   Gastrointestinal: Negative for anal bleeding and blood in stool.   Genitourinary: Negative for dysuria and hematuria.   Musculoskeletal: Negative for arthralgias and back pain.   Skin: Negative for color change and pallor.   Neurological: Negative for dizziness and syncope.   Hematological: Negative for adenopathy. Does not bruise/bleed easily.   Psychiatric/Behavioral: Negative for  agitation and behavioral problems.         Objective      Vital Signs  Temp:  [97.8 °F (36.6 °C)] 97.8 °F (36.6 °C)  Heart Rate:  [65-75] 70  Resp:  [16] 16  BP: (131-144)/() 139/113  Body mass index is 34.87 kg/m².  No intake or output data in the 24 hours ending 06/11/22 1437    Physical Exam  Constitutional:       Appearance: Normal appearance.   HENT:      Head: Normocephalic and atraumatic.   Eyes:      General:         Right eye: No discharge.         Left eye: No discharge.      Pupils: Pupils are equal, round, and reactive to light.   Cardiovascular:      Rate and Rhythm: Normal rate and regular rhythm.   Pulmonary:      Breath sounds: Normal breath sounds.   Abdominal:      General: Abdomen is flat.      Palpations: Abdomen is soft.   Musculoskeletal:         General: No swelling or tenderness.   Skin:     General: Skin is warm and dry.   Neurological:      General: No focal deficit present.      Mental Status: He is alert and oriented to person, place, and time.   Psychiatric:         Mood and Affect: Mood normal.         Behavior: Behavior normal.           Results Review:   I reviewed the patient's new clinical results.  Results from last 7 days   Lab Units 06/11/22  1221 06/11/22  1023   TROPONIN T ng/mL 0.164* 0.115*     Results from last 7 days   Lab Units 06/11/22  1023   WBC 10*3/mm3 5.95   HEMOGLOBIN g/dL 14.1   PLATELETS 10*3/mm3 188     Results from last 7 days   Lab Units 06/11/22  1023   SODIUM mmol/L 142   POTASSIUM mmol/L 4.0   CHLORIDE mmol/L 105   CO2 mmol/L 26.8   BUN mg/dL 11   CREATININE mg/dL 0.98   CALCIUM mg/dL 9.0   GLUCOSE mg/dL 109*   ALT (SGPT) U/L 11   AST (SGOT) U/L 25     Lab Results   Component Value Date    INR 1.04 11/24/2015     Lab Results   Component Value Date    MG 1.9 11/24/2015    MG 2.1 10/14/2015    MG 2.0 10/13/2015     Lab Results   Component Value Date    TSH 1.812 10/14/2015    CHLPL 133 10/05/2015    TRIG 69 10/05/2015    HDL 41 (L) 10/05/2015    LDL 78  10/05/2015      No results found for: BNP    EKG:     Imaging Results (Last 72 Hours)     Procedure Component Value Units Date/Time    XR Chest 1 View [[id number]] Collected: 06/11/22 1342     Updated: 06/11/22 1356    Narrative:      XR CHEST 1 VW-     CLINICAL INDICATION: chest pain        COMPARISON: 02/29/2016      TECHNIQUE: Single frontal view of the chest.     FINDINGS:      LUNGS: Lungs are adequately aerated.      HEART AND MEDIASTINUM: Heart and mediastinal contours are unremarkable        SKELETON: Bony and soft tissue structures are unremarkable.             Impression:      No radiographic evidence of acute cardiac or pulmonary disease.     This report was finalized on 6/11/2022 1:42 PM by Dr. Anival Alexandra MD.              Thank you very much for asking us to be involved in this patient's care.  We will follow along with you.    Samuel Garcia PA-C   06/11/22  14:37 EDT  Electronically signed by Ray Broderick MD, 06/11/22, 3:54 PM EDT.

## 2022-06-12 ENCOUNTER — APPOINTMENT (OUTPATIENT)
Dept: NUCLEAR MEDICINE | Facility: HOSPITAL | Age: 32
End: 2022-06-12

## 2022-06-12 ENCOUNTER — APPOINTMENT (OUTPATIENT)
Dept: CARDIOLOGY | Facility: HOSPITAL | Age: 32
End: 2022-06-12

## 2022-06-12 VITALS
RESPIRATION RATE: 18 BRPM | HEART RATE: 73 BPM | OXYGEN SATURATION: 98 % | SYSTOLIC BLOOD PRESSURE: 151 MMHG | DIASTOLIC BLOOD PRESSURE: 82 MMHG | BODY MASS INDEX: 37.38 KG/M2 | HEIGHT: 71 IN | TEMPERATURE: 98.3 F | WEIGHT: 267 LBS

## 2022-06-12 LAB
BH CV NUCLEAR PRIOR STUDY: 2
BH CV REST NUCLEAR ISOTOPE DOSE: 9.9 MCI
BH CV STRESS BP STAGE 1: NORMAL
BH CV STRESS BP STAGE 2: NORMAL
BH CV STRESS BP STAGE 3: NORMAL
BH CV STRESS DURATION MIN STAGE 1: 3
BH CV STRESS DURATION MIN STAGE 2: 3
BH CV STRESS DURATION MIN STAGE 3: 3
BH CV STRESS DURATION SEC STAGE 1: 0
BH CV STRESS DURATION SEC STAGE 2: 0
BH CV STRESS DURATION SEC STAGE 3: 0
BH CV STRESS GRADE STAGE 1: 10
BH CV STRESS GRADE STAGE 2: 12
BH CV STRESS GRADE STAGE 3: 14
BH CV STRESS HR STAGE 1: 97
BH CV STRESS HR STAGE 2: 116
BH CV STRESS HR STAGE 3: 161
BH CV STRESS METS STAGE 1: 5
BH CV STRESS METS STAGE 2: 7.5
BH CV STRESS METS STAGE 3: 10
BH CV STRESS NUCLEAR ISOTOPE DOSE: 30.1 MCI
BH CV STRESS PROTOCOL 1: NORMAL
BH CV STRESS RECOVERY BP: NORMAL MMHG
BH CV STRESS RECOVERY HR: 93 BPM
BH CV STRESS SPEED STAGE 1: 1.7
BH CV STRESS SPEED STAGE 2: 2.5
BH CV STRESS SPEED STAGE 3: 3.4
BH CV STRESS STAGE 1: 1
BH CV STRESS STAGE 2: 2
BH CV STRESS STAGE 3: 3
LV EF NUC BP: 64 %
MAXIMAL PREDICTED HEART RATE: 190 BPM
PERCENT MAX PREDICTED HR: 84.74 %
QT INTERVAL: 396 MS
QT INTERVAL: 420 MS
QTC INTERVAL: 424 MS
QTC INTERVAL: 436 MS
STRESS BASELINE BP: NORMAL MMHG
STRESS BASELINE HR: 113 BPM
STRESS PERCENT HR: 100 %
STRESS POST ESTIMATED WORKLOAD: 10.1 METS
STRESS POST EXERCISE DUR MIN: 9 MIN
STRESS POST PEAK BP: NORMAL MMHG
STRESS POST PEAK HR: 161 BPM
STRESS TARGET HR: 162 BPM
TROPONIN T SERPL-MCNC: 0.4 NG/ML (ref 0–0.03)
TROPONIN T SERPL-MCNC: 0.42 NG/ML (ref 0–0.03)

## 2022-06-12 PROCEDURE — 93017 CV STRESS TEST TRACING ONLY: CPT

## 2022-06-12 PROCEDURE — 78452 HT MUSCLE IMAGE SPECT MULT: CPT

## 2022-06-12 PROCEDURE — 93010 ELECTROCARDIOGRAM REPORT: CPT | Performed by: INTERNAL MEDICINE

## 2022-06-12 PROCEDURE — G0378 HOSPITAL OBSERVATION PER HR: HCPCS

## 2022-06-12 PROCEDURE — 78452 HT MUSCLE IMAGE SPECT MULT: CPT | Performed by: INTERNAL MEDICINE

## 2022-06-12 PROCEDURE — 93018 CV STRESS TEST I&R ONLY: CPT | Performed by: INTERNAL MEDICINE

## 2022-06-12 PROCEDURE — 96372 THER/PROPH/DIAG INJ SC/IM: CPT

## 2022-06-12 PROCEDURE — 84484 ASSAY OF TROPONIN QUANT: CPT | Performed by: INTERNAL MEDICINE

## 2022-06-12 PROCEDURE — 0 TECHNETIUM SESTAMIBI: Performed by: INTERNAL MEDICINE

## 2022-06-12 PROCEDURE — 99217 PR OBSERVATION CARE DISCHARGE MANAGEMENT: CPT | Performed by: INTERNAL MEDICINE

## 2022-06-12 PROCEDURE — A9500 TC99M SESTAMIBI: HCPCS | Performed by: INTERNAL MEDICINE

## 2022-06-12 PROCEDURE — 93005 ELECTROCARDIOGRAM TRACING: CPT | Performed by: PHYSICIAN ASSISTANT

## 2022-06-12 PROCEDURE — 99214 OFFICE O/P EST MOD 30 MIN: CPT | Performed by: INTERNAL MEDICINE

## 2022-06-12 PROCEDURE — 25010000002 ENOXAPARIN PER 10 MG: Performed by: INTERNAL MEDICINE

## 2022-06-12 RX ORDER — ATORVASTATIN CALCIUM 80 MG/1
40 TABLET, FILM COATED ORAL NIGHTLY
Qty: 15 TABLET | Refills: 0 | Status: SHIPPED | OUTPATIENT
Start: 2022-06-12 | End: 2022-07-12 | Stop reason: SDUPTHER

## 2022-06-12 RX ORDER — ASPIRIN 81 MG/1
81 TABLET ORAL DAILY
Qty: 30 TABLET | Refills: 0 | Status: SHIPPED | OUTPATIENT
Start: 2022-06-13 | End: 2022-07-12 | Stop reason: SDUPTHER

## 2022-06-12 RX ADMIN — METOPROLOL TARTRATE 12.5 MG: 25 TABLET, FILM COATED ORAL at 08:34

## 2022-06-12 RX ADMIN — ENOXAPARIN SODIUM 110 MG: 60 INJECTION SUBCUTANEOUS at 12:57

## 2022-06-12 RX ADMIN — NITROGLYCERIN 1 INCH: 20 OINTMENT TOPICAL at 12:57

## 2022-06-12 RX ADMIN — ACETAMINOPHEN 650 MG: 325 TABLET ORAL at 05:27

## 2022-06-12 RX ADMIN — TECHNETIUM TC 99M SESTAMIBI 1 DOSE: 1 INJECTION INTRAVENOUS at 10:20

## 2022-06-12 RX ADMIN — ASPIRIN 81 MG: 81 TABLET, COATED ORAL at 08:34

## 2022-06-12 RX ADMIN — Medication 10 ML: at 08:35

## 2022-06-12 RX ADMIN — TECHNETIUM TC 99M SESTAMIBI 1 DOSE: 1 INJECTION INTRAVENOUS at 07:20

## 2022-06-12 RX ADMIN — NITROGLYCERIN 1 INCH: 20 OINTMENT TOPICAL at 05:27

## 2022-06-12 RX ADMIN — AMOXICILLIN 1000 MG: 500 CAPSULE ORAL at 08:34

## 2022-06-12 NOTE — PLAN OF CARE
Problem: Adult Inpatient Plan of Care  Goal: Absence of Hospital-Acquired Illness or Injury  Intervention: Prevent Skin Injury  Recent Flowsheet Documentation  Taken 6/11/2022 1937 by Meka Christensen RN  Body Position:   position changed independently   supine  Skin Protection:   adhesive use limited   incontinence pads utilized     Problem: Adult Inpatient Plan of Care  Goal: Optimal Comfort and Wellbeing  Intervention: Provide Person-Centered Care  Recent Flowsheet Documentation  Taken 6/11/2022 1937 by Meka Christensen RN  Trust Relationship/Rapport:   care explained   choices provided   reassurance provided   thoughts/feelings acknowledged   Goal Outcome Evaluation:

## 2022-06-12 NOTE — DISCHARGE SUMMARY
[facility] HOSPITALIST DISCHARGE SUMMARY        Patient ID:  Gaston Hanna  [id number]  32 y.o.  1990    Admit date: 6/11/2022    Discharge date and time: 06/12/22    Admitting Physician: Katayoun Behbahani, MD    Discharge Physician: Dex Lopez    Admission Diagnoses: Chest pain [R07.9]    Discharge Diagnoses: CP    Admission Condition: poor    Discharged Condition: fair    Indication for Admission: Cardio eval, stress test    Hospital Course: Gaston Hanna is a 32 y.o. male with past medical history significant for cardiac ablation due to prior arrhythmia/Vtach.      Mr. Hanna presented to the ED of [facility] on this date for further evaluation of chest discomfort reporting chest/back shoulder pain since this AM with cold sweats and chest tightness. Upon arrival to the ED, vital signs were T 97.8F, pulse 74, RR 16, and BP was 134/91 with EKG with NSR. Initial troponin T was elevated at 0.115 with repeat found to be 0.164.         Mr. Hanna reported onset of chest discomfort to be around 830 AM this morning with discomfort between his shoulder blades and development of what he thought was indigestin.   He reported clamminess during period of chest pressure that was relieved as the pain resolved. He reports symptoms resolved with antacid therapy.       Mr. Hanna reports he was evaluated on Tuesday 06/07/22 at [facility] for sore throat.  He reports he was swabbed for strep and found to be positive.  He started Amoxicillin on said date and has been taking it for a total of 5 days.  He reports chest pain did not start until this AM.  He denies dyspnea, fever, and chills.  He reports sore throat has improved. He denies nausea, vomiting, chills, fatigue.       He denies known significant family history of cardiovascular disease as well as known personal history of cardiovascular disease.        Patient was admitted and troponins were trended.  He underwent stress test after being  "monitored by cardiology.  Stress test was read as low risk.  Cardiology has cleared patient for discharge.  Patient's pain is resolved and no other acute events noted.  He is requesting discharge at this time.  Discussed plan at length with patient and he is in agreement.    Consults: cardiology    Significant Diagnostic Studies: As above    Treatments: As above    Discharge Exam:  /93 (BP Location: Right arm, Patient Position: Lying)   Pulse 85   Temp 98.5 °F (36.9 °C) (Oral)   Resp 20   Ht 180.3 cm (71\")   Wt 121 kg (267 lb)   SpO2 96%   BMI 37.24 kg/m²   General appearance: alert, appears stated age and cooperative  Lungs: clear to auscultation bilaterally  Heart: regular rate and rhythm, S1, S2 normal, no murmur, click, rub or gallop  Abdomen: soft, non-tender; bowel sounds normal; no masses,  no organomegaly  Neurologic: Grossly normal    Disposition: Home or Self Care    Patient Instructions:     Discharge Medications      New Medications      Instructions Start Date   aspirin 81 MG EC tablet   81 mg, Oral, Daily   Start Date: June 13, 2022     atorvastatin 80 MG tablet  Commonly known as: LIPITOR   40 mg, Oral, Nightly      metoprolol tartrate 25 MG tablet  Commonly known as: LOPRESSOR   12.5 mg, Oral, Every 12 Hours Scheduled         Continue These Medications      Instructions Start Date   amoxicillin 500 MG capsule  Commonly known as: AMOXIL   1,000 mg, Oral, 2 Times Daily           Activity: activity as tolerated  Diet: regular diet  Wound Care: none needed    Follow-up with PCP in 1 week.    Discharge process took 26 minutes.    Pending Labs at Discharge:       The ASCVD Risk score (Jovi BURDEN Jr., et al., 2013) failed to calculate for the following reasons:    The 2013 ASCVD risk score is only valid for ages 40 to 79    The patient has a prior MI or stroke diagnosis     Signed:  Dex Lopez Jr, MD  6/12/2022  15:10 EDT     "

## 2022-06-12 NOTE — PROGRESS NOTES
"    [facility] HOSPITALIST PROGRESS NOTE    S: patient seen and examined.  States feels well.  No further CP    O: /93 (BP Location: Right arm, Patient Position: Lying)   Pulse 85   Temp 98.5 °F (36.9 °C) (Oral)   Resp 20   Ht 180.3 cm (71\")   Wt 121 kg (267 lb)   SpO2 96%   BMI 37.24 kg/m²     GENERAL:  age appropriate, NAD  EARS,NOSE, MOUTH, THROAT:  MMM, hearing intact  EYES: PERRL, EOMI  CV:  nl s1/s2  RESPIRATORY:  CTAB no w/c/r  GI:  S/NT/ND +BS  SKIN: No rash or lesions. No nodules.  INT: No edema. No joint deformity.  PSYCH:  Normal affect, A+O x 3  NEURO: Alert and oriented, grossly non focal.      Scheduled Meds:amoxicillin, 1,000 mg, Oral, BID  aspirin, 81 mg, Oral, Daily  atorvastatin, 80 mg, Oral, Nightly  enoxaparin, 1 mg/kg, Subcutaneous, Q12H  metoprolol tartrate, 12.5 mg, Oral, Q12H  nitroglycerin, 1 inch, Topical, Q6H  sodium chloride, 10 mL, Intravenous, Q12H      Continuous Infusions:   PRN Meds:.•  acetaminophen  •  ondansetron  •  sodium chloride  •  sodium chloride    Labs: Laboratory information reviewed and reconciled.  Results from last 7 days   Lab Units 06/11/22  1023   WBC 10*3/mm3 5.95       Results from last 7 days   Lab Units 06/11/22  1023   CO2 mmol/L 26.8   BUN mg/dL 11   CREATININE mg/dL 0.98   GLUCOSE mg/dL 109*             Invalid input(s): APT        Imaging (last 24 hours):  [unfilled]    Assessment/Plan:  # CP  - trop elevated but now downtrending  - cardio following, appreciate recs  - stress test 06/12/22: results pending    # Strep pharyngitis  - continue amoxicillin    # Hx cardiac arrhythmia  - monitor on tele      DVT Prophylaxis: lovenox  Disposition: Continue to monitor  Discharge disposition: TBD  Prognosis: Guarded    Dex Lopez Jr, MD  [facility] Hospitalist  06/12/22  13:45 EDT  "

## 2022-06-12 NOTE — PROGRESS NOTES
LOS: 0 days     Name: Gaston Hanna  Age/Sex: 32 y.o. male  :  1990        PCP: Provider, No Known    Active Problems:    Chest pain      Admission Information:   Gaston Hanna is a 32 y.o. male with past medical history significant for history of nonsustained ventricular tachycardia status post ablation by Dr. Oliveros on 10/5/2015 and tobacco abuse.  He does deny history of diabetes mellitus.  Gaston reports that he presented to [facility] emergency department after developing chest pain this morning around 8:00 that did radiate into his shoulder.  That he described as a sharp sensation that lasted for a few minutes at a time.  Of note he was recently diagnosed with acute streptococcal pharyngitis just a few days ago and was initiated on antibiotics.  Upon arrival in the emergency room he was found to have an elevated troponin initially at 0.115 with redraw 2 hours later being 0.164.  He denies any known family history of premature coronary artery disease.  He also reports he does still feel occasional episodes of palpitations that he describes as PVCs.    Following for: Elevated      Interval history: Gaston did have stress test today which showed normal core perfusion with no evidence of ischemia.  He denies any further episodes of chest pains today no shortness of breath.    Subjective     ROS    Vital Signs  Vitals:    22 0500 22 0527 22 0627 22 1137   BP:  141/98 146/88 156/93   BP Location:   Right arm Right arm   Patient Position:   Lying Lying   Pulse:  59 81 85   Resp:   18 20   Temp:   98.1 °F (36.7 °C) 98.5 °F (36.9 °C)   TempSrc:   Oral Oral   SpO2:   99% 96%   Weight: 121 kg (267 lb)      Height:         Body mass index is 37.24 kg/m².      Intake/Output Summary (Last 24 hours) at 2022 1424  Last data filed at 2022 1230  Gross per 24 hour   Intake 840 ml   Output --   Net 840 ml       Constitutional:       General: Not in  acute distress.     Appearance: Healthy appearance. Well-developed and not in distress. Not diaphoretic.   Eyes:      Conjunctiva/sclera: Conjunctivae normal.      Pupils: Pupils are equal, round, and reactive to light.   HENT:      Head: Normocephalic and atraumatic.   Neck:      Vascular: No carotid bruit or JVD.   Pulmonary:      Effort: Pulmonary effort is normal. No respiratory distress.      Breath sounds: Normal breath sounds.   Cardiovascular:      Normal rate. Regular rhythm.   Edema:     Peripheral edema absent.   Skin:     General: Skin is cool.   Neurological:      Mental Status: Alert, oriented to person, place, and time and oriented to person, place and time.         Results Review:     Results from last 7 days   Lab Units 06/11/22  1023   WBC 10*3/mm3 5.95   HEMOGLOBIN g/dL 14.1   PLATELETS 10*3/mm3 188     Results from last 7 days   Lab Units 06/11/22  1023   SODIUM mmol/L 142   POTASSIUM mmol/L 4.0   CHLORIDE mmol/L 105   CO2 mmol/L 26.8   BUN mg/dL 11   CREATININE mg/dL 0.98   CALCIUM mg/dL 9.0   GLUCOSE mg/dL 109*   ALT (SGPT) U/L 11   AST (SGOT) U/L 25     Results from last 7 days   Lab Units 06/12/22  0534 06/12/22  0009 06/11/22  1705 06/11/22  1221 06/11/22  1023   TROPONIN T ng/mL 0.398* 0.420* 0.332* 0.164* 0.115*             I reviewed the patient's new clinical results.  I reviewed the patient's new imaging results and agree with the interpretation.  I personally viewed and interpreted the patient's EKG/Telemetry data    Medication Review:   amoxicillin, 1,000 mg, Oral, BID  aspirin, 81 mg, Oral, Daily  atorvastatin, 80 mg, Oral, Nightly  enoxaparin, 1 mg/kg, Subcutaneous, Q12H  metoprolol tartrate, 12.5 mg, Oral, Q12H  nitroglycerin, 1 inch, Topical, Q6H  sodium chloride, 10 mL, Intravenous, Q12H           Assessment:  1. History of ventricular tachycardia s/p ablation  2. Recent strep throat  3. Hyperlipidemia  4. Chest pains with elevated cardiac  enzymes      Recommendations:  1. Cardiac.  Patient with episodes of chest pain which started after he developed strep pharyngitis recently.  Echocardiogram showed no abnormal EF EKG was normal with no ischemic changes.  Stress test was done today which did not show any ischemia.  Normal ejection fraction also.  2. Patient has had no much discomfort in the chest.  Troponins are above normal range but are trending down.  Possible type II MI.  Need to rule out type II MI.  We will continue aspirin and metoprolol and Lipitor for now.  3. With advised to follow-up with cardiologist on discharge in 1 to 2 weeks.    I discussed the patients findings and my recommendations with patient and family    Samuel Garcia PA-C  06/12/22  14:24 EDT  Electronically signed by Ray Broderick MD, 06/12/22, 3:33 PM EDT.

## 2022-06-13 ENCOUNTER — TELEPHONE (OUTPATIENT)
Dept: TELEMETRY | Facility: HOSPITAL | Age: 32
End: 2022-06-13

## 2022-06-13 NOTE — CASE MANAGEMENT/SOCIAL WORK
Case Management Discharge Note      Final Note: Patient discharged home on 6/11/22.  No needs identified.         Selected Continued Care - Discharged on 6/12/2022 Admission date: 6/11/2022 - Discharge disposition: Home or Self Care        Final Discharge Disposition Code: 01 - home or self-care

## 2022-06-16 ENCOUNTER — OFFICE VISIT (OUTPATIENT)
Dept: FAMILY MEDICINE CLINIC | Facility: CLINIC | Age: 32
End: 2022-06-16

## 2022-06-16 VITALS
HEART RATE: 65 BPM | SYSTOLIC BLOOD PRESSURE: 128 MMHG | WEIGHT: 262.2 LBS | TEMPERATURE: 96.6 F | DIASTOLIC BLOOD PRESSURE: 78 MMHG | HEIGHT: 71 IN | OXYGEN SATURATION: 99 % | BODY MASS INDEX: 36.71 KG/M2

## 2022-06-16 DIAGNOSIS — I10 HYPERTENSION, UNSPECIFIED TYPE: ICD-10-CM

## 2022-06-16 DIAGNOSIS — R00.2 PALPITATIONS: Primary | ICD-10-CM

## 2022-06-16 PROCEDURE — 99214 OFFICE O/P EST MOD 30 MIN: CPT | Performed by: FAMILY MEDICINE

## 2022-06-16 NOTE — PROGRESS NOTES
Transitional Care Follow Up Visit  Subjective     Docct Hanna is a 32 y.o. male who presents for a transitional care management visit.    Within 48 business hours after discharge our office contacted him via telephone to coordinate his care and needs.      I reviewed and discussed the details of that call along with the discharge summary, hospital problems, inpatient lab results, inpatient diagnostic studies, and consultation reports with Gaston.     Current outpatient and discharge medications have been reconciled for the patient.  Reviewed by: PIERCE Connolly      No flowsheet data found.  Risk for Readmission (LACE) Score: 2 (6/12/2022  6:01 AM)      History of Present Illness   Course During Hospital Stay: Patient was admitted to [facility] 6/11/2022 and discharged 6/12/2022.  Patient presented to the ED with complaints of chest pain.  With a history of cardiac ablation due to prior arrhythmia/V. tach.  EKG showed NSR, initial troponin was elevated at 0.115 with repeat found to be 0.164.  Patient was previously treated for strep on 6/7/2022 and was started on amoxicillin.  Patient was admitted and troponins were trended.  He underwent stress test ever being monitored by cardiology.  Stress test was read as low risk.  Cardiology cleared patient for discharge.  Patient's pain had resolved and no other events occurred.  Was discharged home.    Patient presents to clinic today stating he is feeling much better.  Denies any further chest pain after hospitalization.  States he has not had routine lab work in years.  Patient states he has not had routine labs in years, but he has 8 entering today.  We will order labs today and patient can come back this week while fasting and get routine lab work.  Does have a follow-up with cardiology on 7/6.  Patient is asking about exercise should he resume exercise.  Discussed with patient about continuing with light nonstrenuous exercise until he  follows up with cardiology.     The following portions of the patient's history were reviewed and updated as appropriate: allergies, current medications, past family history, past medical history, past social history, past surgical history and problem list.    Review of Systems    Objective   Physical Exam  Constitutional:       General: He is not in acute distress.     Appearance: Normal appearance. He is well-developed and well-groomed. He is not ill-appearing, toxic-appearing or diaphoretic.   HENT:      Head: Normocephalic.      Nose: Nose normal. No congestion or rhinorrhea.      Mouth/Throat:      Mouth: Mucous membranes are moist.      Pharynx: Oropharynx is clear. No oropharyngeal exudate or posterior oropharyngeal erythema.   Eyes:      General: Lids are normal.         Right eye: No discharge.         Left eye: No discharge.      Extraocular Movements: Extraocular movements intact.      Pupils: Pupils are equal, round, and reactive to light.   Neck:      Vascular: No carotid bruit.   Cardiovascular:      Rate and Rhythm: Normal rate and regular rhythm.      Pulses: Normal pulses.      Heart sounds: Normal heart sounds. No murmur heard.    No friction rub. No gallop.   Pulmonary:      Effort: Pulmonary effort is normal. No respiratory distress.      Breath sounds: Normal breath sounds. No stridor. No wheezing, rhonchi or rales.   Chest:      Chest wall: No tenderness.   Abdominal:      General: Bowel sounds are normal. There is no distension.      Palpations: Abdomen is soft. There is no mass.      Tenderness: There is no abdominal tenderness. There is no right CVA tenderness, left CVA tenderness, guarding or rebound.      Hernia: No hernia is present.   Musculoskeletal:         General: No swelling or tenderness. Normal range of motion.      Cervical back: Normal range of motion and neck supple. No rigidity or tenderness.      Right lower leg: No edema.      Left lower leg: No edema.   Lymphadenopathy:       Cervical: No cervical adenopathy.   Skin:     General: Skin is warm.      Capillary Refill: Capillary refill takes less than 2 seconds.      Coloration: Skin is not jaundiced.      Findings: No bruising, erythema or rash.   Neurological:      General: No focal deficit present.      Mental Status: He is alert and oriented to person, place, and time.      Motor: Motor function is intact. No weakness.      Coordination: Coordination is intact.      Gait: Gait is intact. Gait normal.   Psychiatric:         Attention and Perception: Attention normal.         Mood and Affect: Mood normal.         Speech: Speech normal.         Behavior: Behavior normal.         Cognition and Memory: Cognition normal.         Judgment: Judgment normal.         Assessment & Plan   Diagnoses and all orders for this visit:    1. Palpitations (Primary)  -     Lipid Panel; Future  -     TSH; Future  -     CBC Auto Differential; Future  -     Hemoglobin A1c; Future    2. Hypertension, unspecified type  -     metoprolol tartrate (LOPRESSOR) 25 MG tablet; Take 0.5 tablets by mouth Every 12 (Twelve) Hours for 30 days.  Dispense: 30 tablet; Refill: 1               Procedures     Return in about 4 weeks (around 7/14/2022), or if symptoms worsen or fail to improve.

## 2022-06-30 ENCOUNTER — TELEPHONE (OUTPATIENT)
Dept: FAMILY MEDICINE CLINIC | Facility: CLINIC | Age: 32
End: 2022-06-30

## 2022-06-30 NOTE — TELEPHONE ENCOUNTER
CALLED PATIENT TO REMIND HIM HE HAS FASTING LAB WORK THAT NEEDS TO BE COMPLETED.    SPOKE WITH WIFE AND SHE WILL HAVE HIM COME IN AND GET BLOOD WORK DONE.

## 2022-07-06 ENCOUNTER — OFFICE VISIT (OUTPATIENT)
Dept: CARDIOLOGY | Facility: CLINIC | Age: 32
End: 2022-07-06

## 2022-07-06 VITALS
HEIGHT: 71 IN | BODY MASS INDEX: 37.18 KG/M2 | WEIGHT: 265.6 LBS | HEART RATE: 59 BPM | SYSTOLIC BLOOD PRESSURE: 116 MMHG | DIASTOLIC BLOOD PRESSURE: 79 MMHG | RESPIRATION RATE: 16 BRPM

## 2022-07-06 DIAGNOSIS — R07.2 PRECORDIAL PAIN: ICD-10-CM

## 2022-07-06 DIAGNOSIS — I21.4 NON-ST ELEVATION MI (NSTEMI): Primary | ICD-10-CM

## 2022-07-06 DIAGNOSIS — Z86.79 HISTORY OF VENTRICULAR TACHYCARDIA: ICD-10-CM

## 2022-07-06 PROCEDURE — 99214 OFFICE O/P EST MOD 30 MIN: CPT | Performed by: NURSE PRACTITIONER

## 2022-07-06 NOTE — PROGRESS NOTES
Provider, No Known  Gaston Hanna  1990 07/06/2022    Patient Active Problem List   Diagnosis   • Palpitations   • Anxiety state   • Other malaise and fatigue   • Chest pain       Dear Provider, No Known:    Subjective     Chief Complaint   Patient presents with   • Follow-up     Hosp stay for chest pain, stress and echo done   • Med Management     verbal           History of Present Illness:    Gaston Hanna is a 32 y.o. male with a past medical history of ventricular tachycardia s/p ablation in 2015. He presented to the ED on 6/11/22 with pain between shoulder blades, indigestion, mild chest discomfort, and diaphoresis. He had been diagnosed with Strep pharyngitis a few days prior. He was taking antibiotics at the time. His chest pain resolved without treatment once he arrived at the ED and it never happened again. EKG did not reveal any ischemic changes. Initial troponin was 0.115, 0.164, and then 0.332. He was evaluated by Dr. Broderick while inpatient.  A lexiscan stress test was negative for ischemia. Echocardiogram revealed normal LV function with no valvular abnormalities. He denies any further chest pains since discharge. He does have a history of ventricular tachycardia diagnosed about 7 years ago on an event monitor at the time. He was treated by Dr. Oliveros. He did have an ablation at the time. He has had no issues since.         No Known Allergies:      Current Outpatient Medications:   •  aspirin 81 MG EC tablet, Take 1 tablet by mouth Daily for 30 days., Disp: 30 tablet, Rfl: 0  •  atorvastatin (LIPITOR) 80 MG tablet, Take 0.5 tablets by mouth Every Night for 30 days., Disp: 15 tablet, Rfl: 0  •  metoprolol tartrate (LOPRESSOR) 25 MG tablet, Take 0.5 tablets by mouth Every 12 (Twelve) Hours for 30 days., Disp: 30 tablet, Rfl: 1      The following portions of the patient's history were reviewed and updated as appropriate: allergies, current medications, past family history, past  "medical history, past social history, past surgical history and problem list.    Social History     Tobacco Use   • Smoking status: Never Smoker   • Smokeless tobacco: Never Used   Substance Use Topics   • Alcohol use: No   • Drug use: No       ROS    Objective   Vitals:    07/06/22 1014   BP: 116/79   Pulse: 59   Resp: 16   Weight: 120 kg (265 lb 9.6 oz)   Height: 180.3 cm (71\")     Body mass index is 37.04 kg/m².        Vitals reviewed.   Constitutional:       Appearance: Healthy appearance. Well-developed and not in distress.   HENT:      Head: Normocephalic and atraumatic.   Pulmonary:      Effort: Pulmonary effort is normal.      Breath sounds: Normal breath sounds. No wheezing. No rales.   Cardiovascular:      Normal rate. Regular rhythm.      Murmurs: There is no murmur.      . No S3 and S4 gallop.   Edema:     Peripheral edema absent.   Abdominal:      General: Bowel sounds are normal.      Palpations: Abdomen is soft.   Skin:     General: Skin is warm and dry.   Neurological:      Mental Status: Alert, oriented to person, place, and time and oriented to person, place and time.   Psychiatric:         Mood and Affect: Mood normal.         Behavior: Behavior normal.         Lab Results   Component Value Date     06/11/2022    K 4.0 06/11/2022     06/11/2022    CO2 26.8 06/11/2022    BUN 11 06/11/2022    CREATININE 0.98 06/11/2022    GLUCOSE 109 (H) 06/11/2022    CALCIUM 9.0 06/11/2022    AST 25 06/11/2022    ALT 11 06/11/2022    ALKPHOS 64 06/11/2022    LABIL2 1.4 (L) 03/03/2016     No results found for: CKTOTAL  Lab Results   Component Value Date    WBC 5.95 06/11/2022    HGB 14.1 06/11/2022    HCT 40.7 06/11/2022     06/11/2022     Lab Results   Component Value Date    INR 1.04 11/24/2015     Lab Results   Component Value Date    MG 1.9 11/24/2015     Lab Results   Component Value Date    TSH 1.812 10/14/2015    CHLPL 133 10/05/2015    TRIG 69 10/05/2015    HDL 41 (L) 10/05/2015    LDL 78 " 10/05/2015      No results found for: BNP        Procedures      Assessment & Plan    Diagnosis Plan   1. Non-ST elevation MI (NSTEMI) ([facility])  CT Angiogram Coronary   2. History of ventricular tachycardia s/p ablation in 2015  CT Angiogram Coronary   3. Precordial pain  CT Angiogram Coronary                Recommendations:    1. NSTEMI -will continue with low dose aspirin, atorvastatin, and metoprolol. Will evaluate further with a CT coronary angiogram to rule out any evidence of coronary artery disease.  2. History of ventricular tachycardia s/p ablation about 7 years ago-no known recurrence. Will continue metoprolol.  3. Follow up in 4 weeks or sooner if needed.     Plan of care discussed with Dr. Soto    Return in about 4 weeks (around 8/3/2022) for Recheck.    As always, I appreciate very much the opportunity to participate in the cardiovascular care of your patients.      With Best Regards,    PIERCE Arizmendi

## 2022-07-12 DIAGNOSIS — I10 HYPERTENSION, UNSPECIFIED TYPE: ICD-10-CM

## 2022-07-12 RX ORDER — ATORVASTATIN CALCIUM 80 MG/1
40 TABLET, FILM COATED ORAL NIGHTLY
Qty: 15 TABLET | Refills: 0 | Status: SHIPPED | OUTPATIENT
Start: 2022-07-12 | End: 2022-08-11

## 2022-07-12 RX ORDER — ASPIRIN 81 MG/1
81 TABLET ORAL DAILY
Qty: 30 TABLET | Refills: 0 | Status: SHIPPED | OUTPATIENT
Start: 2022-07-12 | End: 2022-08-12 | Stop reason: SDUPTHER

## 2022-08-12 ENCOUNTER — TELEPHONE (OUTPATIENT)
Dept: CARDIOLOGY | Facility: CLINIC | Age: 32
End: 2022-08-12

## 2022-08-12 DIAGNOSIS — I10 HYPERTENSION, UNSPECIFIED TYPE: ICD-10-CM

## 2022-08-12 RX ORDER — ATORVASTATIN CALCIUM 40 MG/1
40 TABLET, FILM COATED ORAL DAILY
Qty: 30 TABLET | Refills: 4 | Status: SHIPPED | OUTPATIENT
Start: 2022-08-12 | End: 2022-09-08

## 2022-08-12 RX ORDER — ASPIRIN 81 MG/1
81 TABLET ORAL DAILY
Qty: 30 TABLET | Refills: 4 | Status: SHIPPED | OUTPATIENT
Start: 2022-08-12 | End: 2022-09-08

## 2022-08-12 NOTE — TELEPHONE ENCOUNTER
Pt called needing a refill on his Metoprolol 25 mg, Atorvastatin 80mg, and Aspirin 81 mg. The pharmacy is [facility].

## 2022-08-12 NOTE — TELEPHONE ENCOUNTER
Called pt to confirm the MG of his atorvastatin. He requested 80 mg and in his chart it states that he cuts it in half.   Pt stated he has been cutting it in half. I asked him would like for me to send in 40 mg so he doesn't have to cut it in half. He stated that would be better for him.

## 2022-08-24 ENCOUNTER — HOSPITAL ENCOUNTER (OUTPATIENT)
Dept: CT IMAGING | Facility: HOSPITAL | Age: 32
Discharge: HOME | End: 2022-08-24
Admitting: NURSE PRACTITIONER

## 2022-08-24 VITALS
DIASTOLIC BLOOD PRESSURE: 87 MMHG | HEART RATE: 63 BPM | SYSTOLIC BLOOD PRESSURE: 133 MMHG | TEMPERATURE: 97.6 F | BODY MASS INDEX: 38.33 KG/M2 | RESPIRATION RATE: 16 BRPM | HEIGHT: 71 IN | OXYGEN SATURATION: 99 % | WEIGHT: 273.8 LBS

## 2022-08-24 DIAGNOSIS — I21.4 NON-ST ELEVATION MI (NSTEMI): ICD-10-CM

## 2022-08-24 DIAGNOSIS — Z86.79 HISTORY OF VENTRICULAR TACHYCARDIA: ICD-10-CM

## 2022-08-24 DIAGNOSIS — R07.2 PRECORDIAL PAIN: ICD-10-CM

## 2022-08-24 PROCEDURE — 0 IOPAMIDOL PER 1 ML: Performed by: NURSE PRACTITIONER

## 2022-08-24 PROCEDURE — 75574 CT ANGIO HRT W/3D IMAGE: CPT

## 2022-08-24 PROCEDURE — 82565 ASSAY OF CREATININE: CPT

## 2022-08-24 RX ADMIN — IOPAMIDOL 65 ML: 755 INJECTION, SOLUTION INTRAVENOUS at 11:37

## 2022-09-06 LAB — CREAT BLDA-MCNC: 1.1 MG/DL (ref 0.6–1.3)

## 2022-09-08 ENCOUNTER — OFFICE VISIT (OUTPATIENT)
Dept: CARDIOLOGY | Facility: CLINIC | Age: 32
End: 2022-09-08

## 2022-09-08 VITALS
HEART RATE: 62 BPM | RESPIRATION RATE: 16 BRPM | DIASTOLIC BLOOD PRESSURE: 81 MMHG | SYSTOLIC BLOOD PRESSURE: 128 MMHG | WEIGHT: 275.8 LBS | HEIGHT: 71 IN | BODY MASS INDEX: 38.61 KG/M2

## 2022-09-08 DIAGNOSIS — I10 HYPERTENSION, UNSPECIFIED TYPE: Primary | ICD-10-CM

## 2022-09-08 DIAGNOSIS — R77.8 ELEVATED TROPONIN: ICD-10-CM

## 2022-09-08 DIAGNOSIS — Z86.79 HISTORY OF VENTRICULAR TACHYCARDIA: ICD-10-CM

## 2022-09-08 PROCEDURE — 99214 OFFICE O/P EST MOD 30 MIN: CPT | Performed by: NURSE PRACTITIONER

## 2022-09-08 NOTE — PROGRESS NOTES
Provider, No Known  Gaston Hanna  1990 09/08/2022    Patient Active Problem List   Diagnosis   • Palpitations   • Anxiety state   • Other malaise and fatigue   • Chest pain       Dear Provider, No Known:    Subjective     Chief Complaint   Patient presents with   • Follow-up     CT angio   • Palpitations     unchanged   • Med Management     verbal           History of Present Illness:    Gaston Hanna is a 32 y.o. male with a past medical history of ventricular tachycardia status post ablation in 2015 and an elevated troponin in June 2022.  Subsequent Lexiscan stress test was negative for evidence of ischemia.  Echocardiogram revealed normal LV function with no significant valvular abnormalities.  The patient ultimately underwent CT coronary angiogram which revealed a calcium score of 0 with no significant stenosis noted.  He denies any chest pains or shortness of breath.  He is wanting to stop taking the aspirin and atorvastatin.  He has no known history of dyslipidemia.  Occasionally feels his heart racing when he becomes very stressed or emotional but denies any irregular heartbeat, dizziness, or lightheadedness.          No Known Allergies:      Current Outpatient Medications:   •  metoprolol tartrate (LOPRESSOR) 25 MG tablet, Take 0.5 tablets by mouth Every 12 (Twelve) Hours for 30 days., Disp: 30 tablet, Rfl: 4      The following portions of the patient's history were reviewed and updated as appropriate: allergies, current medications, past family history, past medical history, past social history, past surgical history and problem list.    Social History     Tobacco Use   • Smoking status: Never Smoker   • Smokeless tobacco: Never Used   Substance Use Topics   • Alcohol use: No   • Drug use: No       Review of Systems   Constitutional: Negative for decreased appetite and malaise/fatigue.   Cardiovascular: Positive for palpitations. Negative for chest pain and dyspnea on exertion.  "  Respiratory: Negative for cough and shortness of breath.        Objective   Vitals:    09/08/22 1416   BP: 128/81   Pulse: 62   Resp: 16   Weight: 125 kg (275 lb 12.8 oz)   Height: 180.3 cm (71\")     Body mass index is 38.47 kg/m².        Vitals reviewed.   Constitutional:       Appearance: Healthy appearance. Well-developed and not in distress.   HENT:      Head: Normocephalic and atraumatic.   Pulmonary:      Effort: Pulmonary effort is normal.      Breath sounds: Normal breath sounds. No wheezing. No rales.   Cardiovascular:      Normal rate. Regular rhythm.      Murmurs: There is no murmur.      . No S3 and S4 gallop.   Edema:     Peripheral edema absent.   Abdominal:      General: Bowel sounds are normal.      Palpations: Abdomen is soft.   Skin:     General: Skin is warm and dry.   Neurological:      Mental Status: Alert, oriented to person, place, and time and oriented to person, place and time.   Psychiatric:         Mood and Affect: Mood normal.         Behavior: Behavior normal.         Lab Results   Component Value Date     06/11/2022    K 4.0 06/11/2022     06/11/2022    CO2 26.8 06/11/2022    BUN 11 06/11/2022    CREATININE 1.10 08/24/2022    GLUCOSE 109 (H) 06/11/2022    CALCIUM 9.0 06/11/2022    AST 25 06/11/2022    ALT 11 06/11/2022    ALKPHOS 64 06/11/2022    LABIL2 1.4 (L) 03/03/2016     No results found for: CKTOTAL  Lab Results   Component Value Date    WBC 5.95 06/11/2022    HGB 14.1 06/11/2022    HCT 40.7 06/11/2022     06/11/2022     Lab Results   Component Value Date    INR 1.04 11/24/2015     Lab Results   Component Value Date    MG 1.9 11/24/2015     Lab Results   Component Value Date    TSH 1.812 10/14/2015    CHLPL 133 10/05/2015    TRIG 69 10/05/2015    HDL 41 (L) 10/05/2015    LDL 78 10/05/2015      No results found for: BNP        Procedures      Assessment & Plan    Diagnosis Plan   1. Hypertension, unspecified type  Comprehensive Metabolic Panel    Lipid Panel "   2. History of ventricular tachycardia s/p ablation in 2015  Comprehensive Metabolic Panel    Lipid Panel   3. Elevated troponin                  Recommendations:    1. Hypertension-BP has been well controlled with metoprolol, will continue.  2. History of ventricular tachycardia status post ablation-having some palpitations but are not similar to symptoms prior to ablation.  He feels this is stress related and does not wish to pursue any type of event monitor at this time.  3. Elevated troponin- he is very eager to discontinue aspirin and statin.  Since stress and echo were unremarkable and CT coronary angiogram revealed 0 calcium score with no stenosis, will discontinue.  However, I have asked him to obtain a CMP and lipid panel in 2 months to make sure his lipids are well controlled.  4. Follow-up in 6 months or sooner if needed.        Return in about 6 months (around 3/8/2023) for Recheck.    As always, I appreciate very much the opportunity to participate in the cardiovascular care of your patients.      With Best Regards,    PIERCE Arizmendi

## 2023-04-26 ENCOUNTER — OFFICE VISIT (OUTPATIENT)
Dept: CARDIOLOGY | Facility: CLINIC | Age: 33
End: 2023-04-26
Payer: COMMERCIAL

## 2023-04-26 VITALS
DIASTOLIC BLOOD PRESSURE: 83 MMHG | SYSTOLIC BLOOD PRESSURE: 136 MMHG | BODY MASS INDEX: 37.94 KG/M2 | HEART RATE: 57 BPM | OXYGEN SATURATION: 98 % | WEIGHT: 271 LBS | HEIGHT: 71 IN

## 2023-04-26 DIAGNOSIS — I10 HYPERTENSION, UNSPECIFIED TYPE: Primary | ICD-10-CM

## 2023-04-26 DIAGNOSIS — R00.2 PALPITATIONS: ICD-10-CM

## 2023-04-26 DIAGNOSIS — R77.8 ELEVATED TROPONIN: ICD-10-CM

## 2023-04-26 DIAGNOSIS — Z86.79 HISTORY OF VENTRICULAR TACHYCARDIA: ICD-10-CM

## 2023-04-26 PROCEDURE — 93000 ELECTROCARDIOGRAM COMPLETE: CPT | Performed by: NURSE PRACTITIONER

## 2023-04-26 PROCEDURE — 99214 OFFICE O/P EST MOD 30 MIN: CPT | Performed by: NURSE PRACTITIONER

## 2023-04-26 RX ORDER — ATORVASTATIN CALCIUM 40 MG/1
40 TABLET, FILM COATED ORAL DAILY
COMMUNITY
End: 2023-04-26 | Stop reason: SDUPTHER

## 2023-04-26 RX ORDER — ATORVASTATIN CALCIUM 40 MG/1
40 TABLET, FILM COATED ORAL DAILY
Qty: 90 TABLET | Refills: 1 | Status: SHIPPED | OUTPATIENT
Start: 2023-04-26

## 2023-04-26 NOTE — PROGRESS NOTES
Provider, No Known  Gaston Hanna  1990 04/26/2023    Patient Active Problem List   Diagnosis   • Palpitations   • Anxiety state   • Other malaise and fatigue   • Chest pain       Dear Provider, No Known:    Subjective     Chief Complaint   Patient presents with   • Follow-up     ROUTINE   • Dizziness     RECENT SPELL           History of Present Illness:    Gaston Hanna is a 32 y.o. male with a past medical history of ventricular tachycardia status post ablation in 2015 and an elevated troponin in June 2022.  Subsequent Lexiscan stress test was negative for evidence of ischemia.  Echocardiogram revealed normal LV function with no significant valvular abnormalities.  The patient ultimately underwent CT coronary angiogram which revealed a calcium score of 0 with no significant stenosis noted. He presents today for cardiology follow up. He reports he has been doing very well. He has been very physically active, working out at the gym with no issues. Last week he had coffee and a Mt. Dew energy drink. A few hours later he had indigestion, lightheadedness, and jaw pain. Since that time he has been feeling fine. He had not been taking metoprolol prior to this event. He is now back on the metoprolol. He has had some palpitations that feel similar to PVC's he had in the past.           No Known Allergies:      Current Outpatient Medications:   •  atorvastatin (LIPITOR) 40 MG tablet, Take 1 tablet by mouth Daily., Disp: 90 tablet, Rfl: 1  •  metoprolol tartrate (LOPRESSOR) 25 MG tablet, Take 0.5 tablets by mouth Every 12 (Twelve) Hours., Disp: 45 tablet, Rfl: 3      The following portions of the patient's history were reviewed and updated as appropriate: allergies, current medications, past family history, past medical history, past social history, past surgical history and problem list.    Social History     Tobacco Use   • Smoking status: Never   • Smokeless tobacco: Never   Vaping Use   • Vaping Use:  "Never used   Substance Use Topics   • Alcohol use: No   • Drug use: No       Review of Systems   Constitutional: Negative for decreased appetite and malaise/fatigue.   Cardiovascular: Positive for palpitations. Negative for chest pain and dyspnea on exertion.   Respiratory: Negative for cough and shortness of breath.    Gastrointestinal: Positive for heartburn.   Neurological: Positive for dizziness and light-headedness.       Objective   Vitals:    04/26/23 0939   BP: 136/83   Pulse: 57   SpO2: 98%   Weight: 123 kg (271 lb)   Height: 180.3 cm (71\")     Body mass index is 37.8 kg/m².        Vitals reviewed.   Constitutional:       Appearance: Healthy appearance. Well-developed and not in distress.   HENT:      Head: Normocephalic and atraumatic.   Pulmonary:      Effort: Pulmonary effort is normal.      Breath sounds: Normal breath sounds. No wheezing. No rales.   Cardiovascular:      Bradycardia present. Regular rhythm.      Murmurs: There is no murmur.      . No S3 and S4 gallop.   Abdominal:      General: Bowel sounds are normal.      Palpations: Abdomen is soft.   Skin:     General: Skin is warm and dry.   Neurological:      Mental Status: Alert, oriented to person, place, and time and oriented to person, place and time.   Psychiatric:         Mood and Affect: Mood normal.         Behavior: Behavior normal.         Lab Results   Component Value Date     06/11/2022    K 4.0 06/11/2022     06/11/2022    CO2 26.8 06/11/2022    BUN 11 06/11/2022    CREATININE 1.10 08/24/2022    GLUCOSE 109 (H) 06/11/2022    CALCIUM 9.0 06/11/2022    AST 25 06/11/2022    ALT 11 06/11/2022    ALKPHOS 64 06/11/2022    LABIL2 1.4 (L) 03/03/2016     No results found for: CKTOTAL  Lab Results   Component Value Date    WBC 5.95 06/11/2022    HGB 14.1 06/11/2022    HCT 40.7 06/11/2022     06/11/2022     Lab Results   Component Value Date    INR 1.04 11/24/2015     Lab Results   Component Value Date    MG 1.9 11/24/2015 "     Lab Results   Component Value Date    TSH 1.812 10/14/2015    CHLPL 133 10/05/2015    TRIG 69 10/05/2015    HDL 41 (L) 10/05/2015    LDL 78 10/05/2015      No results found for: BNP          ECG 12 Lead    Date/Time: 4/26/2023 9:35 AM  Performed by: Brooke Dumont APRN  Authorized by: Brooke Dumont APRN   Comparison: compared with previous ECG   Similar to previous ECG  Rhythm: sinus bradycardia  BPM: 52                Assessment & Plan    Diagnosis Plan   1. Hypertension, unspecified type  ECG 12 Lead    metoprolol tartrate (LOPRESSOR) 25 MG tablet    Cardiac Event Monitor    CBC & Differential    Comprehensive Metabolic Panel    TSH    Lipid Panel      2. History of ventricular tachycardia s/p ablation in 2015  ECG 12 Lead    Cardiac Event Monitor    CBC & Differential    Comprehensive Metabolic Panel    TSH    Lipid Panel      3. Elevated troponin  ECG 12 Lead    atorvastatin (LIPITOR) 40 MG tablet    CBC & Differential    Comprehensive Metabolic Panel    TSH    Lipid Panel      4. Palpitations  ECG 12 Lead    Cardiac Event Monitor    CBC & Differential    Comprehensive Metabolic Panel    TSH    Lipid Panel                   Recommendations:    1. Since he does have a history of ventricular tachycardia with recent palpitations and lightheadedness, will evaluate further with a 30 day event monitor.  2. Continue metoprolol.  3. Will also obtain CBC, CMP, Lipid panel, and TSH.  4. Follow up in 6 weeks or sooner if needed.         Return in about 6 weeks (around 6/7/2023) for Recheck.    As always, I appreciate very much the opportunity to participate in the cardiovascular care of your patients.      With Best Regards,    PIERCE Arizmendi

## 2023-04-26 NOTE — LETTER
April 26, 2023     Gaston Hanna    Patient: Gaston Hanna   YOB: 1990   Date of Visit: 4/26/2023       Dear Dr. Hanna:    Thank you for referring Gaston Hanna to me for evaluation. Below are the relevant portions of my assessment and plan of care.    If you have questions, please do not hesitate to call me. I look forward to following Gaston along with you.         Sincerely,        PIERCE Arizmendi        CC: No Recipients    Brooke Dumont APRN  04/26/23 1020  Incomplete  Provider, No Known  Gaston Hanna  1990 04/26/2023    Patient Active Problem List   Diagnosis    Palpitations    Anxiety state    Other malaise and fatigue    Chest pain       Dear Provider, No Known:    Subjective      Chief Complaint   Patient presents with    Follow-up     ROUTINE    Dizziness     RECENT SPELL           History of Present Illness:    Gaston Hanna is a 32 y.o. male with a past medical history of ventricular tachycardia status post ablation in 2015 and an elevated troponin in June 2022.  Subsequent Lexiscan stress test was negative for evidence of ischemia.  Echocardiogram revealed normal LV function with no significant valvular abnormalities.  The patient ultimately underwent CT coronary angiogram which revealed a calcium score of 0 with no significant stenosis noted. He presents today for cardiology follow up. He reports he has been doing very well. He has been very physically active, working out at the gym with no issues. Last week he had coffee and a Mt. Dew energy drink. A few hours later he had indigestion, lightheadedness, and jaw pain. Since that time he has been feeling fine. He had not been taking metoprolol prior to this event. He is now back on the metoprolol. He has had some palpitations that feel similar to PVC's he had in the past.           No Known Allergies:      Current Outpatient Medications:     atorvastatin (LIPITOR) 40 MG tablet, Take  "1 tablet by mouth Daily., Disp: , Rfl:     metoprolol tartrate (LOPRESSOR) 25 MG tablet, Take 0.5 tablets by mouth Every 12 (Twelve) Hours for 30 days., Disp: 30 tablet, Rfl: 4      The following portions of the patient's history were reviewed and updated as appropriate: allergies, current medications, past family history, past medical history, past social history, past surgical history and problem list.    Social History     Tobacco Use    Smoking status: Never    Smokeless tobacco: Never   Vaping Use    Vaping Use: Never used   Substance Use Topics    Alcohol use: No    Drug use: No       Review of Systems   Constitutional: Negative for decreased appetite and malaise/fatigue.   Cardiovascular: Positive for palpitations. Negative for chest pain and dyspnea on exertion.   Respiratory: Negative for cough and shortness of breath.    Gastrointestinal: Positive for heartburn.   Neurological: Positive for dizziness and light-headedness.       Objective    Vitals:    04/26/23 0939   BP: 136/83   Pulse: 57   SpO2: 98%   Weight: 123 kg (271 lb)   Height: 180.3 cm (71\")     Body mass index is 37.8 kg/m².        Vitals reviewed.   Constitutional:       Appearance: Healthy appearance. Well-developed and not in distress.   HENT:      Head: Normocephalic and atraumatic.   Pulmonary:      Effort: Pulmonary effort is normal.      Breath sounds: Normal breath sounds. No wheezing. No rales.   Cardiovascular:      Bradycardia present. Regular rhythm.      Murmurs: There is no murmur.      . No S3 and S4 gallop.   Abdominal:      General: Bowel sounds are normal.      Palpations: Abdomen is soft.   Skin:     General: Skin is warm and dry.   Neurological:      Mental Status: Alert, oriented to person, place, and time and oriented to person, place and time.   Psychiatric:         Mood and Affect: Mood normal.         Behavior: Behavior normal.         Lab Results   Component Value Date     06/11/2022    K 4.0 06/11/2022     " 06/11/2022    CO2 26.8 06/11/2022    BUN 11 06/11/2022    CREATININE 1.10 08/24/2022    GLUCOSE 109 (H) 06/11/2022    CALCIUM 9.0 06/11/2022    AST 25 06/11/2022    ALT 11 06/11/2022    ALKPHOS 64 06/11/2022    LABIL2 1.4 (L) 03/03/2016     No results found for: CKTOTAL  Lab Results   Component Value Date    WBC 5.95 06/11/2022    HGB 14.1 06/11/2022    HCT 40.7 06/11/2022     06/11/2022     Lab Results   Component Value Date    INR 1.04 11/24/2015     Lab Results   Component Value Date    MG 1.9 11/24/2015     Lab Results   Component Value Date    TSH 1.812 10/14/2015    CHLPL 133 10/05/2015    TRIG 69 10/05/2015    HDL 41 (L) 10/05/2015    LDL 78 10/05/2015      No results found for: BNP          ECG 12 Lead    Date/Time: 4/26/2023 9:35 AM  Performed by: Brooke Dumont APRN  Authorized by: Brooke Dumont APRN   Comparison: compared with previous ECG   Similar to previous ECG  Rhythm: sinus bradycardia  BPM: 52                Assessment & Plan    Diagnosis Plan   1. History of ventricular tachycardia s/p ablation in 2015        2. Hypertension, unspecified type        3. Elevated troponin                    Recommendations:    Since he does have a history of ventricular tachycardia with recent palpitations and lightheadedness, will evaluate further with a 30 day event monitor.  Continue metoprolol.  Will also obtain CBC, CMP, Lipid panel, and TSH.  Follow up in 6 weeks or sooner if needed.         No follow-ups on file.    As always, I appreciate very much the opportunity to participate in the cardiovascular care of your patients.      With Best Regards,    PIERCE Arizmendi Rebecca, APRN  04/26/23 1005  Sign when Signing Visit  Provider, No Known  Gaston Hanna  1990 04/26/2023    Patient Active Problem List   Diagnosis    Palpitations    Anxiety state    Other malaise and fatigue    Chest pain       Dear Provider, No Known:    Subjective      Chief Complaint  "  Patient presents with    Follow-up     ROUTINE    Dizziness     RECENT SPELL           History of Present Illness:    Gaston Hanna is a 32 y.o. male with a past medical history of          No Known Allergies:      Current Outpatient Medications:     atorvastatin (LIPITOR) 40 MG tablet, Take 1 tablet by mouth Daily., Disp: , Rfl:     metoprolol tartrate (LOPRESSOR) 25 MG tablet, Take 0.5 tablets by mouth Every 12 (Twelve) Hours for 30 days., Disp: 30 tablet, Rfl: 4      The following portions of the patient's history were reviewed and updated as appropriate: allergies, current medications, past family history, past medical history, past social history, past surgical history and problem list.    Social History     Tobacco Use    Smoking status: Never    Smokeless tobacco: Never   Vaping Use    Vaping Use: Never used   Substance Use Topics    Alcohol use: No    Drug use: No       ROS    Objective    Vitals:    04/26/23 0939   BP: 136/83   Pulse: 57   SpO2: 98%   Weight: 123 kg (271 lb)   Height: 180.3 cm (71\")     Body mass index is 37.8 kg/m².        Physical Exam    Lab Results   Component Value Date     06/11/2022    K 4.0 06/11/2022     06/11/2022    CO2 26.8 06/11/2022    BUN 11 06/11/2022    CREATININE 1.10 08/24/2022    GLUCOSE 109 (H) 06/11/2022    CALCIUM 9.0 06/11/2022    AST 25 06/11/2022    ALT 11 06/11/2022    ALKPHOS 64 06/11/2022    LABIL2 1.4 (L) 03/03/2016     No results found for: CKTOTAL  Lab Results   Component Value Date    WBC 5.95 06/11/2022    HGB 14.1 06/11/2022    HCT 40.7 06/11/2022     06/11/2022     Lab Results   Component Value Date    INR 1.04 11/24/2015     Lab Results   Component Value Date    MG 1.9 11/24/2015     Lab Results   Component Value Date    TSH 1.812 10/14/2015    CHLPL 133 10/05/2015    TRIG 69 10/05/2015    HDL 41 (L) 10/05/2015    LDL 78 10/05/2015      No results found for: BNP          ECG 12 Lead    Date/Time: 4/26/2023 9:35 AM  Performed " by: Brooke Dumont APRN  Authorized by: Brooke Dumont APRN   Comparison: compared with previous ECG   Similar to previous ECG  Rhythm: sinus bradycardia  BPM: 52                Assessment & Plan    Diagnosis Plan   1. History of ventricular tachycardia s/p ablation in 2015        2. Hypertension, unspecified type        3. Elevated troponin                    Recommendations:            No follow-ups on file.    As always, I appreciate very much the opportunity to participate in the cardiovascular care of your patients.      With Best Regards,    PIERCE Arizmendi

## 2023-04-27 ENCOUNTER — LAB (OUTPATIENT)
Dept: LAB | Facility: HOSPITAL | Age: 33
End: 2023-04-27
Payer: COMMERCIAL

## 2023-04-27 DIAGNOSIS — R77.8 ELEVATED TROPONIN: ICD-10-CM

## 2023-04-27 DIAGNOSIS — I10 HYPERTENSION, UNSPECIFIED TYPE: ICD-10-CM

## 2023-04-27 DIAGNOSIS — Z86.79 HISTORY OF VENTRICULAR TACHYCARDIA: ICD-10-CM

## 2023-04-27 DIAGNOSIS — R00.2 PALPITATIONS: ICD-10-CM

## 2023-04-27 LAB
ALBUMIN SERPL-MCNC: 4.2 G/DL (ref 3.5–5.2)
ALBUMIN/GLOB SERPL: 1.7 G/DL
ALP SERPL-CCNC: 70 U/L (ref 39–117)
ALT SERPL W P-5'-P-CCNC: 22 U/L (ref 1–41)
ANION GAP SERPL CALCULATED.3IONS-SCNC: 8 MMOL/L (ref 5–15)
AST SERPL-CCNC: 18 U/L (ref 1–40)
BASOPHILS # BLD AUTO: 0.03 10*3/MM3 (ref 0–0.2)
BASOPHILS NFR BLD AUTO: 0.4 % (ref 0–1.5)
BILIRUB SERPL-MCNC: 1 MG/DL (ref 0–1.2)
BUN SERPL-MCNC: 12 MG/DL (ref 6–20)
BUN/CREAT SERPL: 10.6 (ref 7–25)
CALCIUM SPEC-SCNC: 8.9 MG/DL (ref 8.6–10.5)
CHLORIDE SERPL-SCNC: 105 MMOL/L (ref 98–107)
CHOLEST SERPL-MCNC: 118 MG/DL (ref 0–200)
CO2 SERPL-SCNC: 29 MMOL/L (ref 22–29)
CREAT SERPL-MCNC: 1.13 MG/DL (ref 0.76–1.27)
DEPRECATED RDW RBC AUTO: 42.3 FL (ref 37–54)
EGFRCR SERPLBLD CKD-EPI 2021: 89.7 ML/MIN/1.73
EOSINOPHIL # BLD AUTO: 0.16 10*3/MM3 (ref 0–0.4)
EOSINOPHIL NFR BLD AUTO: 2.2 % (ref 0.3–6.2)
ERYTHROCYTE [DISTWIDTH] IN BLOOD BY AUTOMATED COUNT: 12.8 % (ref 12.3–15.4)
GLOBULIN UR ELPH-MCNC: 2.5 GM/DL
GLUCOSE SERPL-MCNC: 91 MG/DL (ref 65–99)
HCT VFR BLD AUTO: 44.9 % (ref 37.5–51)
HDLC SERPL-MCNC: 30 MG/DL (ref 40–60)
HGB BLD-MCNC: 15.6 G/DL (ref 13–17.7)
IMM GRANULOCYTES # BLD AUTO: 0.02 10*3/MM3 (ref 0–0.05)
IMM GRANULOCYTES NFR BLD AUTO: 0.3 % (ref 0–0.5)
LDLC SERPL CALC-MCNC: 70 MG/DL (ref 0–100)
LDLC/HDLC SERPL: 2.33 {RATIO}
LYMPHOCYTES # BLD AUTO: 2.51 10*3/MM3 (ref 0.7–3.1)
LYMPHOCYTES NFR BLD AUTO: 33.7 % (ref 19.6–45.3)
MCH RBC QN AUTO: 31.4 PG (ref 26.6–33)
MCHC RBC AUTO-ENTMCNC: 34.7 G/DL (ref 31.5–35.7)
MCV RBC AUTO: 90.3 FL (ref 79–97)
MONOCYTES # BLD AUTO: 0.47 10*3/MM3 (ref 0.1–0.9)
MONOCYTES NFR BLD AUTO: 6.3 % (ref 5–12)
NEUTROPHILS NFR BLD AUTO: 4.25 10*3/MM3 (ref 1.7–7)
NEUTROPHILS NFR BLD AUTO: 57.1 % (ref 42.7–76)
NRBC BLD AUTO-RTO: 0 /100 WBC (ref 0–0.2)
PLATELET # BLD AUTO: 166 10*3/MM3 (ref 140–450)
PMV BLD AUTO: 13.2 FL (ref 6–12)
POTASSIUM SERPL-SCNC: 4.6 MMOL/L (ref 3.5–5.2)
PROT SERPL-MCNC: 6.7 G/DL (ref 6–8.5)
RBC # BLD AUTO: 4.97 10*6/MM3 (ref 4.14–5.8)
SODIUM SERPL-SCNC: 142 MMOL/L (ref 136–145)
TRIGL SERPL-MCNC: 91 MG/DL (ref 0–150)
TSH SERPL DL<=0.05 MIU/L-ACNC: 2.26 UIU/ML (ref 0.27–4.2)
VLDLC SERPL-MCNC: 18 MG/DL (ref 5–40)
WBC NRBC COR # BLD: 7.44 10*3/MM3 (ref 3.4–10.8)

## 2023-04-27 PROCEDURE — 80061 LIPID PANEL: CPT

## 2023-04-27 PROCEDURE — 80050 GENERAL HEALTH PANEL: CPT

## 2023-04-27 PROCEDURE — 36415 COLL VENOUS BLD VENIPUNCTURE: CPT

## 2023-05-01 ENCOUNTER — TELEPHONE (OUTPATIENT)
Dept: CARDIOLOGY | Facility: CLINIC | Age: 33
End: 2023-05-01
Payer: COMMERCIAL

## 2023-05-01 NOTE — TELEPHONE ENCOUNTER
Patient called and wants to know if someone can call him about his medications he is taking that he has some questions about. Patient also wanted to know if there was anything that needed changed since his labs got done.     Thanks!

## 2023-05-01 NOTE — TELEPHONE ENCOUNTER
Called pt and he stated his BP went up on Friday to 160/110 and he took extra metoprolol and it helped. He stated he also felt slight tightness in his chest on Friday. It didn't last long and hasn't happened since then. He has took 75 mg-100 mg of Metoprolol a day since Friday to keep his BP down.     Advised him that his labs were good. He expressed understanding.

## 2023-05-02 DIAGNOSIS — I10 HYPERTENSION, UNSPECIFIED TYPE: Primary | ICD-10-CM

## 2023-05-02 RX ORDER — LISINOPRIL 5 MG/1
5 TABLET ORAL DAILY
Qty: 90 TABLET | Refills: 3 | Status: SHIPPED | OUTPATIENT
Start: 2023-05-02

## 2023-05-02 NOTE — TELEPHONE ENCOUNTER
I do not want him to increase the metoprolol dose because his heart rate runs too low.  So please ask him to go back to the original dose of metoprolol.  I did send in a prescription for a medication called lisinopril which is for high blood pressure.  Thanks.

## 2023-05-19 ENCOUNTER — TELEPHONE (OUTPATIENT)
Dept: CARDIOLOGY | Facility: CLINIC | Age: 33
End: 2023-05-19
Payer: COMMERCIAL

## 2023-05-19 NOTE — TELEPHONE ENCOUNTER
Caller: Gaston Hanna    Relationship: Self    Best call back number: [phone]    What is the best time to reach you: ANY    What was the call regarding: PATIENT STATES 2 NIGHTS AGO WHILE SITTING ON THE COUCH HE HAD A WEIRD SENSATION IN CHEST LASTING 2 SECONDS; SEEMS THE LAST FEW NIGHTS WHEN HE LAYS DOWN HE LOSES FEELING IN AN ARM/LEG THINKING HE HAS A CIRCULATORY ISSUE. WANTS STEPHANIE AWARE AND POSSIBLY NEEDS TESTING.    Do you require a callback: YES

## 2023-05-19 NOTE — TELEPHONE ENCOUNTER
HUB CAN READ:    Called patient to check on him and to tell him he can discuss this at f/u  on 06/07

## 2023-05-19 NOTE — TELEPHONE ENCOUNTER
Spoke to patient and advised Brooke would discuss and evaluate further these issues upon his upcoming 06/07/2023 appt.

## 2023-06-07 ENCOUNTER — OFFICE VISIT (OUTPATIENT)
Dept: CARDIOLOGY | Facility: CLINIC | Age: 33
End: 2023-06-07
Payer: COMMERCIAL

## 2023-06-07 VITALS
WEIGHT: 271.6 LBS | DIASTOLIC BLOOD PRESSURE: 72 MMHG | OXYGEN SATURATION: 96 % | BODY MASS INDEX: 38.02 KG/M2 | HEART RATE: 57 BPM | RESPIRATION RATE: 16 BRPM | HEIGHT: 71 IN | SYSTOLIC BLOOD PRESSURE: 105 MMHG

## 2023-06-07 DIAGNOSIS — I10 ESSENTIAL HYPERTENSION: Primary | ICD-10-CM

## 2023-06-07 DIAGNOSIS — R07.2 PRECORDIAL PAIN: ICD-10-CM

## 2023-06-07 DIAGNOSIS — R00.2 PALPITATIONS: ICD-10-CM

## 2023-06-07 PROCEDURE — 99214 OFFICE O/P EST MOD 30 MIN: CPT | Performed by: NURSE PRACTITIONER

## 2023-06-07 RX ORDER — LISINOPRIL 5 MG/1
5 TABLET ORAL DAILY
Qty: 30 TABLET | Refills: 11 | Status: SHIPPED | OUTPATIENT
Start: 2023-06-07

## 2023-06-07 NOTE — PROGRESS NOTES
Provider, No Known  Gaston Hanna  1990 06/07/2023    Patient Active Problem List   Diagnosis    Palpitations    Anxiety state    Other malaise and fatigue    Chest pain       Dear Provider, No Known:    Subjective     Chief Complaint   Patient presents with    Follow-up     Event monitor findings    Med Management     verbal           History of Present Illness:    Gaston Hanna is a 33 y.o. male with a past medical history of ventricular tachycardia status post ablation in 2015 and an elevated troponin in June 2022.  He presents today for cardiology follow-up.  Recently he did wear a cardiac event monitor.  This revealed predominantly sinus rhythm with a heart rate ranging from 42 to 151 bpm.  There were no significant arrhythmias.  He has had no further palpitations or chest pains.  A CT coronary angiogram revealed a calcium score of 0 with no significant stenosis noted.  His blood pressure has been well controlled.  He is wanting to try discontinuing his statin since CT coronary angiogram was unremarkable.          No Known Allergies:      Current Outpatient Medications:     lisinopril (PRINIVIL,ZESTRIL) 5 MG tablet, Take 1 tablet by mouth Daily., Disp: 30 tablet, Rfl: 11    metoprolol tartrate (LOPRESSOR) 25 MG tablet, Take 0.5 tablets by mouth Every 12 (Twelve) Hours., Disp: 30 tablet, Rfl: 11      The following portions of the patient's history were reviewed and updated as appropriate: allergies, current medications, past family history, past medical history, past social history, past surgical history and problem list.    Social History     Tobacco Use    Smoking status: Never    Smokeless tobacco: Never   Vaping Use    Vaping Use: Never used   Substance Use Topics    Alcohol use: No    Drug use: No       Review of Systems   Constitutional: Negative for decreased appetite and malaise/fatigue.   Cardiovascular:  Negative for chest pain, dyspnea on exertion and palpitations.   Respiratory:   "Negative for cough and shortness of breath.      Objective   Vitals:    06/07/23 0854   BP: 105/72   Pulse: 57   Resp: 16   SpO2: 96%   Weight: 123 kg (271 lb 9.6 oz)   Height: 180.3 cm (71\")     Body mass index is 37.88 kg/m².        Vitals reviewed.   Constitutional:       Appearance: Healthy appearance. Well-developed and not in distress.   HENT:      Head: Normocephalic and atraumatic.   Pulmonary:      Effort: Pulmonary effort is normal.      Breath sounds: Normal breath sounds. No wheezing. No rales.   Cardiovascular:      Normal rate. Regular rhythm.      Murmurs: There is no murmur.      . No S3 and S4 gallop.   Edema:     Peripheral edema absent.   Abdominal:      General: Bowel sounds are normal.      Palpations: Abdomen is soft.   Skin:     General: Skin is warm and dry.   Neurological:      Mental Status: Alert, oriented to person, place, and time and oriented to person, place and time.   Psychiatric:         Mood and Affect: Mood normal.         Behavior: Behavior normal.       Lab Results   Component Value Date     04/27/2023    K 4.6 04/27/2023     04/27/2023    CO2 29.0 04/27/2023    BUN 12 04/27/2023    CREATININE 1.13 04/27/2023    GLUCOSE 91 04/27/2023    CALCIUM 8.9 04/27/2023    AST 18 04/27/2023    ALT 22 04/27/2023    ALKPHOS 70 04/27/2023    LABIL2 1.4 (L) 03/03/2016     No results found for: CKTOTAL  Lab Results   Component Value Date    WBC 7.44 04/27/2023    HGB 15.6 04/27/2023    HCT 44.9 04/27/2023     04/27/2023     Lab Results   Component Value Date    INR 1.04 11/24/2015     Lab Results   Component Value Date    MG 1.9 11/24/2015     Lab Results   Component Value Date    TSH 2.260 04/27/2023    CHLPL 133 10/05/2015    TRIG 91 04/27/2023    HDL 30 (L) 04/27/2023    LDL 70 04/27/2023      No results found for: BNP        Procedures      Assessment & Plan    Diagnosis Plan   1. Essential hypertension  lisinopril (PRINIVIL,ZESTRIL) 5 MG tablet    Comprehensive Metabolic " Panel    Lipid Panel      2. Precordial pain  Comprehensive Metabolic Panel    Lipid Panel      3. Palpitations  metoprolol tartrate (LOPRESSOR) 25 MG tablet    Comprehensive Metabolic Panel    Lipid Panel                   Recommendations:    We discussed the event monitor findings today.  Since he has had no further palpitations, will continue metoprolol.  His blood pressure is well controlled, will continue lisinopril.  Is wanting to discontinue statin.  He will stop the atorvastatin and complete a CMP and lipid panel in 2 months.  Follow-up in 6 months or sooner if needed.        Return in about 6 months (around 12/7/2023) for Recheck.    As always, I appreciate very much the opportunity to participate in the cardiovascular care of your patients.      With Best Regards,    PIERCE Arizmendi

## 2023-09-29 ENCOUNTER — OFFICE VISIT (OUTPATIENT)
Dept: FAMILY MEDICINE CLINIC | Facility: CLINIC | Age: 33
End: 2023-09-29
Payer: COMMERCIAL

## 2023-09-29 VITALS
WEIGHT: 265.8 LBS | OXYGEN SATURATION: 97 % | BODY MASS INDEX: 37.21 KG/M2 | SYSTOLIC BLOOD PRESSURE: 124 MMHG | HEIGHT: 71 IN | DIASTOLIC BLOOD PRESSURE: 78 MMHG | TEMPERATURE: 98.2 F | HEART RATE: 91 BPM

## 2023-09-29 DIAGNOSIS — R50.9 FEVER, UNSPECIFIED FEVER CAUSE: Primary | ICD-10-CM

## 2023-09-29 DIAGNOSIS — L73.9 FOLLICULITIS: ICD-10-CM

## 2023-09-29 LAB
EXPIRATION DATE: NORMAL
EXPIRATION DATE: NORMAL
FLUAV AG UPPER RESP QL IA.RAPID: NOT DETECTED
FLUBV AG UPPER RESP QL IA.RAPID: NOT DETECTED
INTERNAL CONTROL: NORMAL
INTERNAL CONTROL: NORMAL
Lab: NORMAL
Lab: NORMAL
S PYO AG THROAT QL: NEGATIVE
SARS-COV-2 AG UPPER RESP QL IA.RAPID: NOT DETECTED

## 2023-09-29 PROCEDURE — 87428 SARSCOV & INF VIR A&B AG IA: CPT | Performed by: FAMILY MEDICINE

## 2023-09-29 PROCEDURE — 87880 STREP A ASSAY W/OPTIC: CPT | Performed by: FAMILY MEDICINE

## 2023-09-29 PROCEDURE — 99213 OFFICE O/P EST LOW 20 MIN: CPT | Performed by: FAMILY MEDICINE

## 2023-09-29 NOTE — PROGRESS NOTES
Lety Hanna is a 33 y.o. male.   Pt presents today with CC of Fever      History of Present Illness   History of Present Illness  1.  Patient is a 33-year-old male here complaining of 24 hours, he reports that he began having symptoms about 2 days ago.  He has had contacts at work with similar symptoms.  He has had some mild dry cough, otherwise just nasal congestion and malaise.  He has been utilizing cough syrup with good relief.       The following portions of the patient's history were reviewed and updated as appropriate: allergies, current medications, past family history, past medical history, past social history, past surgical history, and problem list.    Review of Systems   Constitutional:  Positive for fever. Negative for chills and unexpected weight loss.   HENT:  Positive for postnasal drip, rhinorrhea and sinus pressure. Negative for congestion and sore throat.    Eyes:  Negative for blurred vision and visual disturbance.   Respiratory:  Positive for cough. Negative for shortness of breath and wheezing.    Cardiovascular:  Negative for chest pain and palpitations.   Gastrointestinal:  Negative for abdominal pain, diarrhea and nausea.   Endocrine: Negative for cold intolerance and heat intolerance.   Genitourinary:  Negative for dysuria.   Musculoskeletal:  Negative for arthralgias and neck stiffness.   Skin:  Negative for rash.   Neurological:  Negative for dizziness, seizures and syncope.   Psychiatric/Behavioral:  Negative for self-injury, suicidal ideas and depressed mood.      Objective   Physical Exam  Vitals and nursing note reviewed.   Constitutional:       Appearance: He is well-developed. He is ill-appearing. He is not diaphoretic.   HENT:      Head: Normocephalic and atraumatic.      Right Ear: External ear normal.      Left Ear: External ear normal.      Nose: Nose normal.   Eyes:      Conjunctiva/sclera: Conjunctivae normal.      Pupils: Pupils are equal, round, and  reactive to light.   Cardiovascular:      Rate and Rhythm: Normal rate and regular rhythm.      Heart sounds: Normal heart sounds.   Pulmonary:      Effort: Pulmonary effort is normal.      Breath sounds: Normal breath sounds.   Abdominal:      General: Bowel sounds are normal.      Palpations: Abdomen is soft.   Musculoskeletal:      Cervical back: Normal range of motion and neck supple.   Skin:     General: Skin is warm and dry.   Neurological:      Mental Status: He is alert and oriented to person, place, and time.   Psychiatric:         Behavior: Behavior normal.         Assessment & Plan   Diagnoses and all orders for this visit:    1. Fever, unspecified fever cause (Primary)  -     POCT SARS-CoV-2 Antigen SUZE + Flu  -     POCT rapid strep A  Negative for COVID, flu, and strep.  Vital signs are normal.  He seems to be in the acute phase of a viral illness.  I recommend not working today, he does not work over the weekend.  Assuming his fever has resolved, which I assume will, he can return to work Monday without restrictions.  Note for work can be found scanned into his chart.  2. Folliculitis  He has an infected hair follicle on his left lateral calf, central papule, area of erythema around it about the size of a dime.  Reassurance given.  Recommend against lancing it, or oral antibiotic for it.  He has topical Neosporin to apply.               This document has been electronically signed by Ace Abdalla DO  September 29, 2023 09:48 EDT    Dictated Utilizing Dragon Dictation: Part of this note may be an electronic transcription/translation of spoken language to printed text using the Dragon Dictation System.

## 2023-12-01 ENCOUNTER — TELEPHONE (OUTPATIENT)
Dept: CARDIOLOGY | Facility: CLINIC | Age: 33
End: 2023-12-01
Payer: COMMERCIAL

## 2023-12-01 NOTE — TELEPHONE ENCOUNTER
HUB TO RELAY  Called pt to inform them of labs needed for upcoming appointment on 12/7, these are fasting labs, they can be completed at the hospital or diagnostic center.  No answer left vm for call back.

## 2024-01-03 ENCOUNTER — TELEPHONE (OUTPATIENT)
Dept: CARDIOLOGY | Facility: CLINIC | Age: 34
End: 2024-01-03
Payer: COMMERCIAL

## 2024-01-03 NOTE — TELEPHONE ENCOUNTER
"  Caller: Gaston Hanna    Relationship: Self    Best call back number: [phone]     What was the call regarding: PT STATES HE WOKE UP THIS MORNING WITH CHEST PAINS, HAD SOME \"WEIRD FEELINGS\". NOT REALLY A FLUTTER, BUT KIND OF LIGHTHEADED FOR A SECOND. HE STATES HE IS OKAY NOW BUT HAVING CHEST TIGHTNESS/ SOB.   THIS STARTED THIS MORNING   HAD THIS BEFORE, BUT IT HAS BEEN AWHILE.     CALLED OFFICE AND WAS TOLD TO TELL THE PT TO GO TO THE ER, PT STATES HE WILL THINK ABOUT IT BUT CALL US BACK IF HE DOES NOT GO. JUST MAKING PROVIDER AWARE.           "

## 2024-01-03 NOTE — TELEPHONE ENCOUNTER
Spoke with pt and advised him that if he is having those symptoms he would need to report to ED, but he states it has stopped now. Pt requested a sooner appointment, moved to [facility] [place] on 1/11.

## 2024-01-03 NOTE — TELEPHONE ENCOUNTER
Caller: Gaston Hanna    Relationship to patient: Self    Best call back number: [phone]    Chief complaint: PT BEEN HAVING CHEST PAINS AND WOULD LIKE AN EKG    Type of visit: FU    Requested date: ASAP     If rescheduling, when is the original appointment: 1.17.24

## 2024-01-03 NOTE — TELEPHONE ENCOUNTER
Spoke with pt and advised him that if he is having those symptoms he would need to report to ED, but he states it has stopped now. Pt requested a sooner appointment, moved to [facility] on 1/11.

## 2024-01-08 ENCOUNTER — TELEPHONE (OUTPATIENT)
Dept: CARDIOLOGY | Facility: CLINIC | Age: 34
End: 2024-01-08
Payer: COMMERCIAL

## 2024-01-08 NOTE — TELEPHONE ENCOUNTER
Name: Gaston Hanna BELLA      Relationship: Self      Best Callback Number: [phone]      HUB PROVIDED THE RELAY MESSAGE FROM THE OFFICE      PATIENT: VOICED UNDERSTANDING AND HAS NO FURTHER QUESTIONS AT THIS TIME    ADDITIONAL INFORMATION:

## 2024-01-08 NOTE — TELEPHONE ENCOUNTER
Caller: Gaston Hanna    Relationship: Self    Best call back number:[phone]    What orders are you requesting (i.e. lab or imaging): LABS    In what timeframe would the patient need to come in: TOMORROW    Where will you receive your lab/imaging services: IMAGING CENTER    Additional notes: PATIENT REQUESTING ORDER SENT TO THE IMAGING CENTER.

## 2024-01-08 NOTE — TELEPHONE ENCOUNTER
Hub to relay.     Called pt to remind him to get his labs done prior to his apt on Thursday. He will need to fast prior to these labs. No answer ROSALINE.

## 2024-01-09 ENCOUNTER — LAB (OUTPATIENT)
Dept: LAB | Facility: HOSPITAL | Age: 34
End: 2024-01-09

## 2024-01-09 DIAGNOSIS — R00.2 PALPITATIONS: ICD-10-CM

## 2024-01-09 DIAGNOSIS — I10 ESSENTIAL HYPERTENSION: ICD-10-CM

## 2024-01-09 DIAGNOSIS — R07.2 PRECORDIAL PAIN: ICD-10-CM

## 2024-01-09 LAB
ALBUMIN SERPL-MCNC: 4.5 G/DL (ref 3.5–5.2)
ALBUMIN/GLOB SERPL: 2.1 G/DL
ALP SERPL-CCNC: 60 U/L (ref 39–117)
ALT SERPL W P-5'-P-CCNC: 21 U/L (ref 1–41)
ANION GAP SERPL CALCULATED.3IONS-SCNC: 8 MMOL/L (ref 5–15)
AST SERPL-CCNC: 21 U/L (ref 1–40)
BILIRUB SERPL-MCNC: 0.9 MG/DL (ref 0–1.2)
BUN SERPL-MCNC: 11 MG/DL (ref 6–20)
BUN/CREAT SERPL: 9.9 (ref 7–25)
CALCIUM SPEC-SCNC: 9.4 MG/DL (ref 8.6–10.5)
CHLORIDE SERPL-SCNC: 104 MMOL/L (ref 98–107)
CHOLEST SERPL-MCNC: 153 MG/DL (ref 0–200)
CO2 SERPL-SCNC: 28 MMOL/L (ref 22–29)
CREAT SERPL-MCNC: 1.11 MG/DL (ref 0.76–1.27)
EGFRCR SERPLBLD CKD-EPI 2021: 91 ML/MIN/1.73
GLOBULIN UR ELPH-MCNC: 2.1 GM/DL
GLUCOSE SERPL-MCNC: 99 MG/DL (ref 65–99)
HDLC SERPL-MCNC: 30 MG/DL (ref 40–60)
LDLC SERPL CALC-MCNC: 100 MG/DL (ref 0–100)
LDLC/HDLC SERPL: 3.25 {RATIO}
POTASSIUM SERPL-SCNC: 4.4 MMOL/L (ref 3.5–5.2)
PROT SERPL-MCNC: 6.6 G/DL (ref 6–8.5)
SODIUM SERPL-SCNC: 140 MMOL/L (ref 136–145)
TRIGL SERPL-MCNC: 127 MG/DL (ref 0–150)
VLDLC SERPL-MCNC: 23 MG/DL (ref 5–40)

## 2024-01-09 PROCEDURE — 80053 COMPREHEN METABOLIC PANEL: CPT

## 2024-01-09 PROCEDURE — 80061 LIPID PANEL: CPT

## 2024-01-09 PROCEDURE — 36415 COLL VENOUS BLD VENIPUNCTURE: CPT

## 2024-01-11 ENCOUNTER — OFFICE VISIT (OUTPATIENT)
Dept: CARDIOLOGY | Facility: CLINIC | Age: 34
End: 2024-01-11
Payer: COMMERCIAL

## 2024-01-11 VITALS
HEIGHT: 71 IN | BODY MASS INDEX: 36.29 KG/M2 | SYSTOLIC BLOOD PRESSURE: 124 MMHG | WEIGHT: 259.2 LBS | HEART RATE: 76 BPM | OXYGEN SATURATION: 97 % | DIASTOLIC BLOOD PRESSURE: 77 MMHG

## 2024-01-11 DIAGNOSIS — E78.5 HYPERLIPIDEMIA LDL GOAL <100: Primary | ICD-10-CM

## 2024-01-11 DIAGNOSIS — I10 ESSENTIAL HYPERTENSION: Chronic | ICD-10-CM

## 2024-01-11 DIAGNOSIS — R00.2 PALPITATIONS: ICD-10-CM

## 2024-01-11 RX ORDER — LISINOPRIL 5 MG/1
5 TABLET ORAL DAILY
Qty: 90 TABLET | Refills: 3 | Status: SHIPPED | OUTPATIENT
Start: 2024-01-11

## 2024-01-11 NOTE — PROGRESS NOTES
"Chief Complaint  Follow-up (ROUTINE)    Subjective          Gaston Hanna presents to [facility]ARDIOLOG[facility] for follow up.    History of Present Illness  Mr. Connelly presents for routine follow-up of hypertension, hyperlipidemia, and tachycardia status post ablation.  He reports that he did not understand that he was supposed to take his metoprolol twice daily.  He has been only taking 12 and half milligrams in the mornings.  He does not check his blood pressure at home, but he denies headache or vision change that would indicate poor control.    He does report that he had an episode of chest discomfort the other morning.  It resolved after taking his metoprolol.    He had discontinued his statin at his last visit.  Discussed that his LDL increased by 30 points since our last check.  Lengthy discussion of dietary adjustments that can be made.  He reports that he exercises for an hour a day most days per week with a combination of strength and cardio.  Tobacco Use: Low Risk  (1/11/2024)    Patient History     Smoking Tobacco Use: Never     Smokeless Tobacco Use: Never     Passive Exposure: Not on file       Objective     Vital Signs:   /77   Pulse 76   Ht 180.3 cm (71\")   Wt 118 kg (259 lb 3.2 oz)   SpO2 97%   BMI 36.15 kg/m²       Physical Exam  Vitals and nursing note reviewed.   Constitutional:       General: He is not in acute distress.     Appearance: He is obese.      Comments: Athletic build   HENT:      Head: Normocephalic and atraumatic.   Eyes:      Conjunctiva/sclera: Conjunctivae normal.   Neck:      Vascular: No carotid bruit.   Cardiovascular:      Rate and Rhythm: Normal rate and regular rhythm.      Pulses: Normal pulses.   Pulmonary:      Effort: Pulmonary effort is normal.      Breath sounds: Normal breath sounds.   Musculoskeletal:      Cervical back: Neck supple.      Right lower leg: No edema.      Left lower leg: No edema.   Skin:     General: Skin is warm and " dry.   Neurological:      General: No focal deficit present.      Mental Status: He is alert.   Psychiatric:         Mood and Affect: Mood normal.         Behavior: Behavior normal.          Result Review :   The following data was reviewed by: PIERCE Diaz on 01/11/2024:  Common labs          4/27/2023    08:38 1/9/2024    08:38   Common Labs   Glucose 91  99    BUN 12  11    Creatinine 1.13  1.11    Sodium 142  140    Potassium 4.6  4.4    Chloride 105  104    Calcium 8.9  9.4    Albumin 4.2  4.5    Total Bilirubin 1.0  0.9    Alkaline Phosphatase 70  60    AST (SGOT) 18  21    ALT (SGPT) 22  21    WBC 7.44     Hemoglobin 15.6     Hematocrit 44.9     Platelets 166     Total Cholesterol 118  153    Triglycerides 91  127    HDL Cholesterol 30  30    LDL Cholesterol  70  100      Lipid Panel          4/27/2023    08:38 1/9/2024    08:38   Lipid Panel   Total Cholesterol 118  153    Triglycerides 91  127    HDL Cholesterol 30  30    VLDL Cholesterol 18  23    LDL Cholesterol  70  100    LDL/HDL Ratio 2.33  3.25      Data reviewed : Cardiology studies as detailed below      Last Cardiac Cath      Last Stress test  Results for orders placed during the hospital encounter of 06/11/22    Stress Test With Myocardial Perfusion One Day    Interpretation Summary  · (Calculated EF = 64%).  · Myocardial perfusion imaging indicates a normal myocardial perfusion study with no evidence of ischemia.  · Impressions are consistent with a low risk study.  · There is no prior study available for comparison.  · Low risk for ischemic heart disease.  · Findings consistent with a normal ECG stress test.  · TID 0.88       Last Echo  Results for orders placed during the hospital encounter of 06/11/22    Adult Transthoracic Echo Complete W/ Cont if Necessary Per Protocol    Interpretation Summary  · Left ventricular ejection fraction appears to be 56 - 60%.  · Left ventricular diastolic function was normal.         ECG 12  Lead    Date/Time: 1/11/2024 4:02 PM  Performed by: Nancy Farrar APRN    Authorized by: Nancy Farrar APRN  Comparison: compared with previous ECG from 4/26/2023  Similar to previous ECG  Comparison to previous ECG: Sinus bradycardia 52  Rhythm: sinus rhythm  Rate: normal  BPM: 70  QRS axis: normal    Clinical impression: normal ECG           Current Outpatient Medications   Medication Sig Dispense Refill    lisinopril (PRINIVIL,ZESTRIL) 5 MG tablet Take 1 tablet by mouth Daily. 90 tablet 3    metoprolol tartrate (LOPRESSOR) 25 MG tablet Take 0.5 tablets by mouth Every 12 (Twelve) Hours. 90 tablet 3     No current facility-administered medications for this visit.            Assessment and Plan    Problem List Items Addressed This Visit       Palpitations    Relevant Medications    metoprolol tartrate (LOPRESSOR) 25 MG tablet    Other Relevant Orders    Comprehensive Metabolic Panel    ECG 12 Lead    Hyperlipidemia LDL goal <100 - Primary (Chronic)    Overview     04/27/2023-total cholesterol 118, triglycerides 91, HDL 30, LDL 70    01/09/2024-total cholesterol 153, triglycerides 127, HDL 30,          Relevant Orders    Lipid Panel    Essential hypertension (Chronic)    Relevant Medications    lisinopril (PRINIVIL,ZESTRIL) 5 MG tablet    metoprolol tartrate (LOPRESSOR) 25 MG tablet    Other Relevant Orders    Comprehensive Metabolic Panel    CBC (No Diff)     Diagnoses and all orders for this visit:    1. Hyperlipidemia LDL goal <100 (Primary)  -     Lipid Panel; Future    2. Essential hypertension  -     lisinopril (PRINIVIL,ZESTRIL) 5 MG tablet; Take 1 tablet by mouth Daily.  Dispense: 90 tablet; Refill: 3  -     Comprehensive Metabolic Panel; Future  -     CBC (No Diff); Future    3. Palpitations  -     metoprolol tartrate (LOPRESSOR) 25 MG tablet; Take 0.5 tablets by mouth Every 12 (Twelve) Hours.  Dispense: 90 tablet; Refill: 3  -     Comprehensive Metabolic Panel; Future  -     ECG 12  Lead      Information about DASH diet posted to his MyChart.  Plan to repeat labs in 1 year.  Encouraged him to take his metoprolol twice a day.      Follow Up     Return in about 1 year (around 1/11/2025).    Patient was given instructions and counseling regarding his condition or for health maintenance advice. Please see specific information pulled into the AVS if appropriate.           Dictated Utilizing Dragon Dictation: Part of this note may be an electronic transcription/translation of spoken language to printed text using the Dragon Dictation System

## 2024-01-15 ENCOUNTER — HOSPITAL ENCOUNTER (EMERGENCY)
Facility: HOSPITAL | Age: 34
Discharge: HOME | End: 2024-01-15
Attending: STUDENT IN AN ORGANIZED HEALTH CARE EDUCATION/TRAINING PROGRAM | Admitting: EMERGENCY MEDICINE

## 2024-01-15 ENCOUNTER — APPOINTMENT (OUTPATIENT)
Dept: GENERAL RADIOLOGY | Facility: HOSPITAL | Age: 34
End: 2024-01-15

## 2024-01-15 VITALS
BODY MASS INDEX: 35 KG/M2 | SYSTOLIC BLOOD PRESSURE: 109 MMHG | OXYGEN SATURATION: 99 % | DIASTOLIC BLOOD PRESSURE: 71 MMHG | RESPIRATION RATE: 18 BRPM | HEIGHT: 71 IN | WEIGHT: 250 LBS | HEART RATE: 64 BPM | TEMPERATURE: 98.4 F

## 2024-01-15 DIAGNOSIS — R07.9 CHEST PAIN, UNSPECIFIED TYPE: Primary | ICD-10-CM

## 2024-01-15 DIAGNOSIS — R00.2 PALPITATIONS: ICD-10-CM

## 2024-01-15 LAB
ALBUMIN SERPL-MCNC: 4.3 G/DL (ref 3.5–5.2)
ALBUMIN/GLOB SERPL: 1.8 G/DL
ALP SERPL-CCNC: 54 U/L (ref 39–117)
ALT SERPL W P-5'-P-CCNC: 16 U/L (ref 1–41)
AMPHET+METHAMPHET UR QL: NEGATIVE
AMPHETAMINES UR QL: NEGATIVE
ANION GAP SERPL CALCULATED.3IONS-SCNC: 8.7 MMOL/L (ref 5–15)
AST SERPL-CCNC: 16 U/L (ref 1–40)
BARBITURATES UR QL SCN: NEGATIVE
BASOPHILS # BLD AUTO: 0.03 10*3/MM3 (ref 0–0.2)
BASOPHILS NFR BLD AUTO: 0.6 % (ref 0–1.5)
BENZODIAZ UR QL SCN: NEGATIVE
BILIRUB SERPL-MCNC: 1.2 MG/DL (ref 0–1.2)
BILIRUB UR QL STRIP: NEGATIVE
BUN SERPL-MCNC: 11 MG/DL (ref 6–20)
BUN/CREAT SERPL: 10.1 (ref 7–25)
BUPRENORPHINE SERPL-MCNC: NEGATIVE NG/ML
CALCIUM SPEC-SCNC: 9.2 MG/DL (ref 8.6–10.5)
CANNABINOIDS SERPL QL: NEGATIVE
CHLORIDE SERPL-SCNC: 103 MMOL/L (ref 98–107)
CLARITY UR: CLEAR
CO2 SERPL-SCNC: 30.3 MMOL/L (ref 22–29)
COCAINE UR QL: NEGATIVE
COLOR UR: YELLOW
CREAT SERPL-MCNC: 1.09 MG/DL (ref 0.76–1.27)
DEPRECATED RDW RBC AUTO: 41.2 FL (ref 37–54)
EGFRCR SERPLBLD CKD-EPI 2021: 93.1 ML/MIN/1.73
EOSINOPHIL # BLD AUTO: 0.08 10*3/MM3 (ref 0–0.4)
EOSINOPHIL NFR BLD AUTO: 1.6 % (ref 0.3–6.2)
ERYTHROCYTE [DISTWIDTH] IN BLOOD BY AUTOMATED COUNT: 12.5 % (ref 12.3–15.4)
FENTANYL UR-MCNC: NEGATIVE NG/ML
GEN 5 2HR TROPONIN T REFLEX: 12 NG/L
GLOBULIN UR ELPH-MCNC: 2.4 GM/DL
GLUCOSE SERPL-MCNC: 126 MG/DL (ref 65–99)
GLUCOSE UR STRIP-MCNC: NEGATIVE MG/DL
HCT VFR BLD AUTO: 43.3 % (ref 37.5–51)
HGB BLD-MCNC: 14.7 G/DL (ref 13–17.7)
HGB UR QL STRIP.AUTO: NEGATIVE
HOLD SPECIMEN: NORMAL
HOLD SPECIMEN: NORMAL
IMM GRANULOCYTES # BLD AUTO: 0.01 10*3/MM3 (ref 0–0.05)
IMM GRANULOCYTES NFR BLD AUTO: 0.2 % (ref 0–0.5)
KETONES UR QL STRIP: ABNORMAL
LEUKOCYTE ESTERASE UR QL STRIP.AUTO: NEGATIVE
LYMPHOCYTES # BLD AUTO: 1.97 10*3/MM3 (ref 0.7–3.1)
LYMPHOCYTES NFR BLD AUTO: 39.1 % (ref 19.6–45.3)
MCH RBC QN AUTO: 30.4 PG (ref 26.6–33)
MCHC RBC AUTO-ENTMCNC: 33.9 G/DL (ref 31.5–35.7)
MCV RBC AUTO: 89.5 FL (ref 79–97)
METHADONE UR QL SCN: NEGATIVE
MONOCYTES # BLD AUTO: 0.22 10*3/MM3 (ref 0.1–0.9)
MONOCYTES NFR BLD AUTO: 4.4 % (ref 5–12)
NEUTROPHILS NFR BLD AUTO: 2.73 10*3/MM3 (ref 1.7–7)
NEUTROPHILS NFR BLD AUTO: 54.1 % (ref 42.7–76)
NITRITE UR QL STRIP: NEGATIVE
NRBC BLD AUTO-RTO: 0 /100 WBC (ref 0–0.2)
OPIATES UR QL: NEGATIVE
OXYCODONE UR QL SCN: NEGATIVE
PCP UR QL SCN: NEGATIVE
PH UR STRIP.AUTO: 6.5 [PH] (ref 5–8)
PLATELET # BLD AUTO: 157 10*3/MM3 (ref 140–450)
PMV BLD AUTO: 11.9 FL (ref 6–12)
POTASSIUM SERPL-SCNC: 4 MMOL/L (ref 3.5–5.2)
PROT SERPL-MCNC: 6.7 G/DL (ref 6–8.5)
PROT UR QL STRIP: NEGATIVE
RBC # BLD AUTO: 4.84 10*6/MM3 (ref 4.14–5.8)
SODIUM SERPL-SCNC: 142 MMOL/L (ref 136–145)
SP GR UR STRIP: 1.03 (ref 1–1.03)
TRICYCLICS UR QL SCN: NEGATIVE
TROPONIN T DELTA: NORMAL
TROPONIN T SERPL HS-MCNC: <6 NG/L
TSH SERPL DL<=0.05 MIU/L-ACNC: 1.48 UIU/ML (ref 0.27–4.2)
UROBILINOGEN UR QL STRIP: ABNORMAL
WBC NRBC COR # BLD AUTO: 5.04 10*3/MM3 (ref 3.4–10.8)
WHOLE BLOOD HOLD COAG: NORMAL
WHOLE BLOOD HOLD SPECIMEN: NORMAL

## 2024-01-15 PROCEDURE — 84484 ASSAY OF TROPONIN QUANT: CPT

## 2024-01-15 PROCEDURE — 81003 URINALYSIS AUTO W/O SCOPE: CPT | Performed by: PHYSICIAN ASSISTANT

## 2024-01-15 PROCEDURE — 25810000003 SODIUM CHLORIDE 0.9 % SOLUTION: Performed by: PHYSICIAN ASSISTANT

## 2024-01-15 PROCEDURE — 71046 X-RAY EXAM CHEST 2 VIEWS: CPT | Performed by: RADIOLOGY

## 2024-01-15 PROCEDURE — 99284 EMERGENCY DEPT VISIT MOD MDM: CPT

## 2024-01-15 PROCEDURE — 71046 X-RAY EXAM CHEST 2 VIEWS: CPT

## 2024-01-15 PROCEDURE — 85025 COMPLETE CBC W/AUTO DIFF WBC: CPT

## 2024-01-15 PROCEDURE — 93005 ELECTROCARDIOGRAM TRACING: CPT | Performed by: PHYSICIAN ASSISTANT

## 2024-01-15 PROCEDURE — 36415 COLL VENOUS BLD VENIPUNCTURE: CPT

## 2024-01-15 PROCEDURE — 80307 DRUG TEST PRSMV CHEM ANLYZR: CPT | Performed by: PHYSICIAN ASSISTANT

## 2024-01-15 PROCEDURE — 93005 ELECTROCARDIOGRAM TRACING: CPT

## 2024-01-15 PROCEDURE — 80053 COMPREHEN METABOLIC PANEL: CPT

## 2024-01-15 PROCEDURE — 84443 ASSAY THYROID STIM HORMONE: CPT | Performed by: PHYSICIAN ASSISTANT

## 2024-01-15 RX ORDER — ASPIRIN 325 MG
325 TABLET ORAL ONCE
Status: DISCONTINUED | OUTPATIENT
Start: 2024-01-15 | End: 2024-01-15

## 2024-01-15 RX ORDER — SODIUM CHLORIDE 0.9 % (FLUSH) 0.9 %
10 SYRINGE (ML) INJECTION AS NEEDED
Status: DISCONTINUED | OUTPATIENT
Start: 2024-01-15 | End: 2024-01-15 | Stop reason: HOSPADM

## 2024-01-15 RX ADMIN — SODIUM CHLORIDE 1000 ML: 9 INJECTION, SOLUTION INTRAVENOUS at 18:52

## 2024-01-15 NOTE — ED PROVIDER NOTES
Subjective   History of Present Illness  34 yo male pt presents to the ED with complaints of chest pain episodes.  PT states that last couple of weeks he has had unexplained episodes of lightheadedness and CP.  PT was seen by cardiologist on Thursday and was told it was anxiety. Pt states that he feels like something is off. Pt denies any current symptoms.  Pt states he had an ablation several years ago due to V tach.  Pt states that he also has hx of HTN.      History provided by:  Patient   used: No        Review of Systems   Constitutional: Negative.    HENT: Negative.     Eyes: Negative.    Cardiovascular:  Positive for chest pain and palpitations.   Endocrine: Negative.    Genitourinary: Negative.    Musculoskeletal: Negative.    Skin: Negative.    Allergic/Immunologic: Negative.    Neurological: Negative.    Hematological: Negative.    Psychiatric/Behavioral:  The patient is nervous/anxious.    All other systems reviewed and are negative.      Past Medical History:   Diagnosis Date    Anxiety state 06/11/2022    Essential hypertension 1/11/2024    Hyperlipidemia LDL goal <100 1/11/2024    Palpitations 06/11/2022       No Known Allergies    Past Surgical History:   Procedure Laterality Date    CARDIAC ABLATION      CHOLECYSTECTOMY         Family History   Problem Relation Age of Onset    Cancer Mother         Breast CA    Diabetes Father        Social History     Socioeconomic History    Marital status:    Tobacco Use    Smoking status: Never    Smokeless tobacco: Never   Vaping Use    Vaping Use: Never used   Substance and Sexual Activity    Alcohol use: No    Drug use: No    Sexual activity: Defer           Objective   Physical Exam  Vitals and nursing note reviewed.   Constitutional:       Appearance: He is well-developed and normal weight.   HENT:      Head: Normocephalic and atraumatic.   Eyes:      Extraocular Movements: Extraocular movements intact.      Pupils: Pupils are  equal, round, and reactive to light.   Cardiovascular:      Rate and Rhythm: Normal rate and regular rhythm.      Heart sounds: Normal heart sounds.   Pulmonary:      Effort: Pulmonary effort is normal.      Breath sounds: Normal breath sounds.   Abdominal:      General: Bowel sounds are normal.      Palpations: Abdomen is soft.   Musculoskeletal:         General: Normal range of motion.      Cervical back: Normal range of motion and neck supple.   Skin:     General: Skin is warm.   Neurological:      General: No focal deficit present.      Mental Status: He is alert and oriented to person, place, and time.   Psychiatric:         Mood and Affect: Mood normal.         Behavior: Behavior normal.         Procedures           ED Course  ED Course as of 01/15/24 1935   Mon Ncio 15, 2024   1454 ECG 14:52 NSR, rate 60. Nonspecific T wave abn.. qT/qTc 386/386 [JOANN]   1518 CXR : negative [JOANN]   1812 ECG 17:54 Sinus bradycardia, rate 48. No acute sT -T abnormalities noted. qT/wRw282/362 [JOANN]   1851 Sign out to Dr. Acharya. He has seen [facility].  [ML]      ED Course User Index  [JOANN] Jett Acharya MD  [ML] Roshni Gunn PA                HEART Score: 1                              Medical Decision Making  32 yo male pt presents to the ED with complaints of chest pain episodes.  PT states that last couple of weeks he has had unexplained episodes of lightheadedness and CP.  PT was seen by cardiologist on Thursday and was told it was anxiety. Pt states that he feels like something is off. Pt denies any current symptoms.  Pt states he had an ablation several years ago due to V tach.  Pt states that he also has hx of HTN.      Problems Addressed:  Chest pain, unspecified type: complicated acute illness or injury  Palpitations: acute illness or injury    Amount and/or Complexity of Data Reviewed  Labs: ordered.  ECG/medicine tests: ordered.        Final diagnoses:   Chest pain, unspecified type   Palpitations       ED  Disposition  ED Disposition       ED Disposition   Discharge    Condition   Stable    Comment   --               No follow-up provider specified.       Medication List      No changes were made to your prescriptions during this visit.            Jett Acharya MD  01/15/24 1742

## 2024-01-16 LAB
QT INTERVAL: 386 MS
QT INTERVAL: 406 MS
QTC INTERVAL: 362 MS
QTC INTERVAL: 386 MS

## 2024-01-16 NOTE — DISCHARGE INSTRUCTIONS
Call one of the offices below to establish a primary care provider.  If you are unable to get an appointment and feel it is an emergency and need to be seen immediately please return to the Emergency Department.    Call one of the office below to set up a primary care provider.    Dr. Marcial Suresh                                                                                                       [address] [facility] [address]  [phone]    Dr. Vickers, Dr. TOPHER Sheriff, Dr. SKIP Sheriff ([facility])  [address] [facility] [address]  [phone]    Dr. Whaley, Dr. Trinh, Dr. Arnold ([facility])  [address] [facility] [address]  [phone]    Dr. Yang  [address] [facility] [address]  [phone]    Dr. Haywood, Dr. Alvarez, Dr. Chester, Dr. Bishop ([facility])  [address] [place] [address] [address]  [facility] [address]  [phone]    Dr. Latia Marley  [address] [facility] [place] [address]  [phone]    Dr. Nieves Fortune  [address]   [address]  [facility] [address]  [phone]    Dr. Suresh  [address] [facility] [address]  [phone]    Dr. Rao, Dr. Silver  [address] [facility] [place] [address]  [phone]    Dr. Rowan  [address]                                                              [address]  [facility] [place] [address]  [phone]    Dr. Bower  [address] [facility] [address]  [phone]    Dr. Ivelisse Roy  [address]  [address]  [place] [address]  [phone]    Dr. Machado and [facility]   [address] [facility]  [address]  [place] [address]  [phone]

## 2024-01-24 ENCOUNTER — OFFICE VISIT (OUTPATIENT)
Dept: CARDIOLOGY | Facility: CLINIC | Age: 34
End: 2024-01-24
Payer: COMMERCIAL

## 2024-01-24 VITALS
HEART RATE: 64 BPM | HEIGHT: 71 IN | DIASTOLIC BLOOD PRESSURE: 76 MMHG | SYSTOLIC BLOOD PRESSURE: 121 MMHG | BODY MASS INDEX: 37.32 KG/M2 | OXYGEN SATURATION: 96 % | WEIGHT: 266.6 LBS

## 2024-01-24 DIAGNOSIS — I10 ESSENTIAL HYPERTENSION: Primary | ICD-10-CM

## 2024-01-24 DIAGNOSIS — R00.1 BRADYCARDIA, SINUS: ICD-10-CM

## 2024-01-24 PROCEDURE — 99214 OFFICE O/P EST MOD 30 MIN: CPT | Performed by: NURSE PRACTITIONER

## 2024-01-24 NOTE — PROGRESS NOTES
Provider, No Known  Gaston Hanna  1990 01/24/2024    Patient Active Problem List   Diagnosis    Palpitations    Anxiety state    Other malaise and fatigue    Chest pain    Hyperlipidemia LDL goal <100    Essential hypertension       Dear Provider, No Known:    Subjective     Chief Complaint   Patient presents with    Follow-up     [facility] ER F/U    FEELING BETTER NOW           History of Present Illness:    Gaston Hanna is a 33 y.o. male with a past medical history of hypertension, hyperlipidemia, and ventricular tachycardia s/p ablation. He presents today for cardiology follow up. 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 He went to the ER and was bradycardic. This occurred while taking metoprolol tartrate 12.5 mg BID. EKG did not reveal any acute ischemic changes. Troponin was negative. Labs unremarkable. He has since quit taking his medications, but does cut his metoprolol into 1/4 tablet and take this daily. He works out at the gym and tolerates activity very well. He occasionally has some fleeting chest discomfort while at rest. He reports he does have anxiety but tries to manage this with non pharmacological methods such as reducing caffeine and sleep hygiene.           No Known Allergies:    No current outpatient medications on file.      The following portions of the patient's history were reviewed and updated as appropriate: allergies, current medications, past family history, past medical history, past social history, past surgical history and problem list.    Social History     Tobacco Use    Smoking status: Never    Smokeless tobacco: Never   Vaping Use    Vaping Use: Never used   Substance Use Topics    Alcohol use: No    Drug use: No       Review of Systems   Constitutional: Negative for decreased appetite and malaise/fatigue.   Cardiovascular:  Positive for chest pain and near-syncope. Negative for dyspnea on exertion and palpitations.   Respiratory:  " Negative for cough and shortness of breath.        Objective   Vitals:    01/24/24 1139   BP: 121/76   Pulse: 64   SpO2: 96%   Weight: 121 kg (266 lb 9.6 oz)   Height: 180.3 cm (71\")     Body mass index is 37.18 kg/m².        Vitals reviewed.   Constitutional:       Appearance: Healthy appearance. Well-developed and not in distress.   HENT:      Head: Normocephalic and atraumatic.   Pulmonary:      Effort: Pulmonary effort is normal.      Breath sounds: Normal breath sounds. No wheezing. No rales.   Cardiovascular:      Normal rate. Regular rhythm.      Murmurs: There is no murmur.      . No S3 and S4 gallop.   Edema:     Peripheral edema absent.   Abdominal:      General: Bowel sounds are normal.      Palpations: Abdomen is soft.   Skin:     General: Skin is warm and dry.   Neurological:      Mental Status: Alert, oriented to person, place, and time and oriented to person, place and time.   Psychiatric:         Mood and Affect: Mood normal.         Behavior: Behavior normal.         Lab Results   Component Value Date     01/15/2024    K 4.0 01/15/2024     01/15/2024    CO2 30.3 (H) 01/15/2024    BUN 11 01/15/2024    CREATININE 1.09 01/15/2024    GLUCOSE 126 (H) 01/15/2024    CALCIUM 9.2 01/15/2024    AST 16 01/15/2024    ALT 16 01/15/2024    ALKPHOS 54 01/15/2024    LABIL2 1.4 (L) 03/03/2016     No results found for: \"CKTOTAL\"  Lab Results   Component Value Date    WBC 5.04 01/15/2024    HGB 14.7 01/15/2024    HCT 43.3 01/15/2024     01/15/2024     Lab Results   Component Value Date    INR 1.04 11/24/2015     Lab Results   Component Value Date    MG 1.9 11/24/2015     Lab Results   Component Value Date    TSH 1.480 01/15/2024    CHLPL 133 10/05/2015    TRIG 127 01/09/2024    HDL 30 (L) 01/09/2024     01/09/2024      No results found for: \"BNP\"        Procedures      Assessment & Plan    Diagnosis Plan   1. Essential hypertension        2. Bradycardia, sinus                 "     Recommendations:    Essential hypertension - BP well controlled. He monitors diligently at home and will let us know if BP rises.  Sinus bradycardia - discontinue metoprolol entirely since he is feeling better. If he has any recurrent symptoms would consider event monitor.  Follow up in 3 months or sooner if needed.        Return in about 3 months (around 4/24/2024) for Recheck.    As always, I appreciate very much the opportunity to participate in the cardiovascular care of your patients.      With Best Regards,    PIERCE Arizmendi

## 2024-02-19 NOTE — TELEPHONE ENCOUNTER
Patient called and stated he needs a refill on Lisinopril 5mg sent to [facility].

## 2024-02-20 NOTE — TELEPHONE ENCOUNTER
Last visit, he had stopped taking his BP medication. Please find out why he is wanting to take this again. Is his BP high?

## 2024-02-21 RX ORDER — LISINOPRIL 5 MG/1
5 TABLET ORAL DAILY
Qty: 90 TABLET | Refills: 0 | Status: SHIPPED | OUTPATIENT
Start: 2024-02-21

## 2024-02-21 NOTE — TELEPHONE ENCOUNTER
Hub to relay.     Called pt and advised him that Brooke sent the script in for him. He expressed understanding.

## 2024-02-21 NOTE — TELEPHONE ENCOUNTER
Spoke with pt, he states he has been having problems with elevated BP's for the last week. Yesterday it was 160/75, he said it was only like that for a few minutes and then it was 140/80, then finally dropped to 135/85. He did say that [facility] had 1 refill for this medication for a 90 day supply.

## 2024-05-09 ENCOUNTER — TELEPHONE (OUTPATIENT)
Dept: CARDIOLOGY | Facility: CLINIC | Age: 34
End: 2024-05-09
Payer: COMMERCIAL

## 2024-05-09 NOTE — TELEPHONE ENCOUNTER
Pt is needing a refill on his Lisinopril 5 mg and he is also asking if we can up the dose he is still having blood pressure issues

## 2024-05-10 RX ORDER — LISINOPRIL 10 MG/1
5 TABLET ORAL DAILY
Qty: 30 TABLET | Refills: 2 | Status: SHIPPED | OUTPATIENT
Start: 2024-05-10 | End: 2024-05-10

## 2024-05-10 RX ORDER — LISINOPRIL 10 MG/1
10 TABLET ORAL DAILY
Qty: 30 TABLET | Refills: 2 | Status: SHIPPED | OUTPATIENT
Start: 2024-05-10

## 2024-06-13 ENCOUNTER — OFFICE VISIT (OUTPATIENT)
Dept: CARDIOLOGY | Facility: CLINIC | Age: 34
End: 2024-06-13
Payer: COMMERCIAL

## 2024-06-13 VITALS
DIASTOLIC BLOOD PRESSURE: 83 MMHG | HEIGHT: 71 IN | BODY MASS INDEX: 36.09 KG/M2 | SYSTOLIC BLOOD PRESSURE: 126 MMHG | WEIGHT: 257.8 LBS | OXYGEN SATURATION: 96 % | HEART RATE: 57 BPM

## 2024-06-13 DIAGNOSIS — R40.0 DAYTIME SOMNOLENCE: ICD-10-CM

## 2024-06-13 DIAGNOSIS — R00.1 BRADYCARDIA, SINUS: ICD-10-CM

## 2024-06-13 DIAGNOSIS — I10 ESSENTIAL HYPERTENSION: Primary | ICD-10-CM

## 2024-06-13 PROCEDURE — 99214 OFFICE O/P EST MOD 30 MIN: CPT | Performed by: NURSE PRACTITIONER

## 2024-06-13 NOTE — PROGRESS NOTES
Provider, No Known  Gaston Hanna  1990 06/13/2024    Patient Active Problem List   Diagnosis    Palpitations    Anxiety state    Other malaise and fatigue    Chest pain    Hyperlipidemia LDL goal <100    Essential hypertension       Dear Provider, No Known:    Subjective     Chief Complaint   Patient presents with    Follow-up     3 month follow up      Med Management     Patient takes no medications,              History of Present Illness:    Gaston Hanna is a 34 y.o. male with a past medical history of  hypertension, hyperlipidemia, and ventricular tachycardia s/p ablation. He presents today for cardiology follow up. Previously he had symptomatic bradycardia while on metoprolol, thus the medication was discontinued. He does note he has a heart rate in the 40's usually during sleep, sometimes during early morning hours on his smart watch. He does report heart rates up to 170 bpm while exercising. He has had daytime fatigue after sleeping about 7 hours most nights. He also snores at night. He quit taking lisinopril and BP has been well controlled.          No Known Allergies:    No current outpatient medications on file.      The following portions of the patient's history were reviewed and updated as appropriate: allergies, current medications, past family history, past medical history, past social history, past surgical history and problem list.    Social History     Tobacco Use    Smoking status: Never    Smokeless tobacco: Never   Vaping Use    Vaping status: Never Used   Substance Use Topics    Alcohol use: No    Drug use: No       Review of Systems   Constitutional: Positive for malaise/fatigue. Negative for decreased appetite.   Cardiovascular:  Negative for dyspnea on exertion and palpitations.   Respiratory:  Negative for cough and shortness of breath.        Objective   Vitals:    06/13/24 1438   BP: 126/83   BP Location: Left arm   Patient Position: Sitting   Cuff Size: Adult  "  Pulse: 57   SpO2: 96%   Weight: 117 kg (257 lb 12.8 oz)   Height: 180.3 cm (70.98\")     Body mass index is 35.97 kg/m².        Vitals reviewed.   Constitutional:       Appearance: Healthy appearance. Well-developed and not in distress.   HENT:      Head: Normocephalic and atraumatic.   Pulmonary:      Effort: Pulmonary effort is normal.      Breath sounds: Normal breath sounds. No wheezing. No rales.   Cardiovascular:      Bradycardia present. Regular rhythm.      Murmurs: There is no murmur.      . No S3 and S4 gallop.   Edema:     Peripheral edema absent.   Abdominal:      General: Bowel sounds are normal.      Palpations: Abdomen is soft.   Skin:     General: Skin is warm and dry.   Neurological:      Mental Status: Alert, oriented to person, place, and time and oriented to person, place and time.   Psychiatric:         Mood and Affect: Mood normal.         Behavior: Behavior normal.         Lab Results   Component Value Date     01/15/2024    K 4.0 01/15/2024     01/15/2024    CO2 30.3 (H) 01/15/2024    BUN 11 01/15/2024    CREATININE 1.09 01/15/2024    GLUCOSE 126 (H) 01/15/2024    CALCIUM 9.2 01/15/2024    AST 16 01/15/2024    ALT 16 01/15/2024    ALKPHOS 54 01/15/2024    LABIL2 1.4 (L) 03/03/2016     Lab Results   Component Value Date    WBC 5.04 01/15/2024    HGB 14.7 01/15/2024    HCT 43.3 01/15/2024     01/15/2024     Lab Results   Component Value Date    TSH 1.480 01/15/2024    CHLPL 133 10/05/2015    TRIG 127 01/09/2024    HDL 30 (L) 01/09/2024     01/09/2024          Results for orders placed during the hospital encounter of 06/11/22    Adult Transthoracic Echo Complete W/ Cont if Necessary Per Protocol    Interpretation Summary  · Left ventricular ejection fraction appears to be 56 - 60%.  · Left ventricular diastolic function was normal.     Results for orders placed during the hospital encounter of 06/11/22    Stress Test With Myocardial Perfusion One Day    Interpretation " Summary  · (Calculated EF = 64%).  · Myocardial perfusion imaging indicates a normal myocardial perfusion study with no evidence of ischemia.  · Impressions are consistent with a low risk study.  · There is no prior study available for comparison.  · Low risk for ischemic heart disease.  · Findings consistent with a normal ECG stress test.  · TID 0.88           Procedures      Assessment & Plan    Diagnosis Plan   1. Essential hypertension        2. Bradycardia, sinus  Home Sleep Study      3. Daytime somnolence  Home Sleep Study                   Recommendations:    Essential hypertension - BP well controlled.  Sinus bradycardia - minimum heart rate in the 40's during sleep. I did offer to order a holter monitor but he has declined for now. He monitors his heart rate on his smart watch. No definite indication for PPM at this time for heart rate in the 40's during sleep (also noted on previous event monitor). Given his daytime somnolence, will order a home sleep study to rule out FRANK.  Daytime somnolence - home sleep study ordered.  Follow up in 6 months or sooner if needed.         Return in about 6 months (around 12/13/2024) for Recheck.    As always, I appreciate very much the opportunity to participate in the cardiovascular care of your patients.      With Best Regards,    PIERCE Arizmendi

## 2024-06-26 ENCOUNTER — TELEPHONE (OUTPATIENT)
Dept: CARDIOLOGY | Facility: CLINIC | Age: 34
End: 2024-06-26
Payer: COMMERCIAL

## 2024-06-26 NOTE — TELEPHONE ENCOUNTER
Pt is trying to get insurance and said that there is something not right with his medical records and is needing to talk with someone

## 2024-06-27 NOTE — TELEPHONE ENCOUNTER
Called pt and he stated that his insurance is denying him due to an event that happened 2 years when they thought he was having a MI. He stated he is needing a letter stating that his heart is in good health since all his test have been good. The insurance is not wanting to accept him due to it stating he had a MI. Insurance is called  [employer].

## 2024-06-28 NOTE — TELEPHONE ENCOUNTER
Hub to relay.     Called pt to advise him that he can come by and get his medical records (office notes) and send that to them but we can write a letter. No answer ROSALINE.

## 2024-07-01 NOTE — TELEPHONE ENCOUNTER
Name: CyndiGaston      Relationship: Self      Best Callback Number: [phone]      HUB PROVIDED THE RELAY MESSAGE FROM THE OFFICE      PATIENT: HAS FURTHER QUESTIONS AND WOULD LIKE A CALL BACK AT THE FOLLOWING PHONE NUMBER

## 2024-07-08 ENCOUNTER — APPOINTMENT (OUTPATIENT)
Dept: GENERAL RADIOLOGY | Facility: HOSPITAL | Age: 34
End: 2024-07-08
Payer: MEDICAID

## 2024-07-08 ENCOUNTER — HOSPITAL ENCOUNTER (EMERGENCY)
Facility: HOSPITAL | Age: 34
Discharge: HOME | End: 2024-07-08
Attending: EMERGENCY MEDICINE
Payer: MEDICAID

## 2024-07-08 ENCOUNTER — TELEPHONE (OUTPATIENT)
Dept: CARDIOLOGY | Facility: CLINIC | Age: 34
End: 2024-07-08
Payer: MEDICAID

## 2024-07-08 VITALS
DIASTOLIC BLOOD PRESSURE: 75 MMHG | RESPIRATION RATE: 18 BRPM | SYSTOLIC BLOOD PRESSURE: 120 MMHG | HEART RATE: 46 BPM | WEIGHT: 245 LBS | TEMPERATURE: 97.9 F | BODY MASS INDEX: 35.07 KG/M2 | OXYGEN SATURATION: 98 % | HEIGHT: 70 IN

## 2024-07-08 DIAGNOSIS — R00.1 SINUS BRADYCARDIA: ICD-10-CM

## 2024-07-08 DIAGNOSIS — R07.89 CHEST DISCOMFORT: Primary | ICD-10-CM

## 2024-07-08 LAB
ALBUMIN SERPL-MCNC: 4.4 G/DL (ref 3.5–5.2)
ALBUMIN/GLOB SERPL: 1.6 G/DL
ALP SERPL-CCNC: 58 U/L (ref 39–117)
ALT SERPL W P-5'-P-CCNC: 12 U/L (ref 1–41)
ANION GAP SERPL CALCULATED.3IONS-SCNC: 7 MMOL/L (ref 5–15)
AST SERPL-CCNC: 17 U/L (ref 1–40)
BASOPHILS # BLD AUTO: 0.01 10*3/MM3 (ref 0–0.2)
BASOPHILS NFR BLD AUTO: 0.2 % (ref 0–1.5)
BILIRUB SERPL-MCNC: 1.6 MG/DL (ref 0–1.2)
BUN SERPL-MCNC: 13 MG/DL (ref 6–20)
BUN/CREAT SERPL: 10.8 (ref 7–25)
CALCIUM SPEC-SCNC: 9.2 MG/DL (ref 8.6–10.5)
CHLORIDE SERPL-SCNC: 102 MMOL/L (ref 98–107)
CO2 SERPL-SCNC: 31 MMOL/L (ref 22–29)
CREAT SERPL-MCNC: 1.2 MG/DL (ref 0.76–1.27)
D DIMER PPP FEU-MCNC: <0.27 MCGFEU/ML (ref 0–0.5)
DEPRECATED RDW RBC AUTO: 41 FL (ref 37–54)
EGFRCR SERPLBLD CKD-EPI 2021: 82.4 ML/MIN/1.73
EOSINOPHIL # BLD AUTO: 0.07 10*3/MM3 (ref 0–0.4)
EOSINOPHIL NFR BLD AUTO: 1.1 % (ref 0.3–6.2)
ERYTHROCYTE [DISTWIDTH] IN BLOOD BY AUTOMATED COUNT: 12.5 % (ref 12.3–15.4)
GEN 5 2HR TROPONIN T REFLEX: <6 NG/L
GLOBULIN UR ELPH-MCNC: 2.8 GM/DL
GLUCOSE SERPL-MCNC: 97 MG/DL (ref 65–99)
HCT VFR BLD AUTO: 45 % (ref 37.5–51)
HGB BLD-MCNC: 15.6 G/DL (ref 13–17.7)
HOLD SPECIMEN: NORMAL
IMM GRANULOCYTES # BLD AUTO: 0.01 10*3/MM3 (ref 0–0.05)
IMM GRANULOCYTES NFR BLD AUTO: 0.2 % (ref 0–0.5)
LIPASE SERPL-CCNC: 24 U/L (ref 13–60)
LYMPHOCYTES # BLD AUTO: 2.82 10*3/MM3 (ref 0.7–3.1)
LYMPHOCYTES NFR BLD AUTO: 43.1 % (ref 19.6–45.3)
MAGNESIUM SERPL-MCNC: 2 MG/DL (ref 1.6–2.6)
MCH RBC QN AUTO: 30.8 PG (ref 26.6–33)
MCHC RBC AUTO-ENTMCNC: 34.7 G/DL (ref 31.5–35.7)
MCV RBC AUTO: 88.9 FL (ref 79–97)
MONOCYTES # BLD AUTO: 0.36 10*3/MM3 (ref 0.1–0.9)
MONOCYTES NFR BLD AUTO: 5.5 % (ref 5–12)
NEUTROPHILS NFR BLD AUTO: 3.27 10*3/MM3 (ref 1.7–7)
NEUTROPHILS NFR BLD AUTO: 49.9 % (ref 42.7–76)
NRBC BLD AUTO-RTO: 0 /100 WBC (ref 0–0.2)
NT-PROBNP SERPL-MCNC: <36 PG/ML (ref 0–450)
PLATELET # BLD AUTO: 182 10*3/MM3 (ref 140–450)
PMV BLD AUTO: 12.4 FL (ref 6–12)
POTASSIUM SERPL-SCNC: 4 MMOL/L (ref 3.5–5.2)
PROT SERPL-MCNC: 7.2 G/DL (ref 6–8.5)
RBC # BLD AUTO: 5.06 10*6/MM3 (ref 4.14–5.8)
SODIUM SERPL-SCNC: 140 MMOL/L (ref 136–145)
TROPONIN T DELTA: NORMAL
TROPONIN T SERPL HS-MCNC: <6 NG/L
TSH SERPL DL<=0.05 MIU/L-ACNC: 1.38 UIU/ML (ref 0.27–4.2)
WBC NRBC COR # BLD AUTO: 6.54 10*3/MM3 (ref 3.4–10.8)
WHOLE BLOOD HOLD COAG: NORMAL
WHOLE BLOOD HOLD SPECIMEN: NORMAL

## 2024-07-08 PROCEDURE — 85379 FIBRIN DEGRADATION QUANT: CPT | Performed by: EMERGENCY MEDICINE

## 2024-07-08 PROCEDURE — 83690 ASSAY OF LIPASE: CPT | Performed by: EMERGENCY MEDICINE

## 2024-07-08 PROCEDURE — 83735 ASSAY OF MAGNESIUM: CPT | Performed by: EMERGENCY MEDICINE

## 2024-07-08 PROCEDURE — 85025 COMPLETE CBC W/AUTO DIFF WBC: CPT | Performed by: EMERGENCY MEDICINE

## 2024-07-08 PROCEDURE — 36415 COLL VENOUS BLD VENIPUNCTURE: CPT

## 2024-07-08 PROCEDURE — 93005 ELECTROCARDIOGRAM TRACING: CPT | Performed by: EMERGENCY MEDICINE

## 2024-07-08 PROCEDURE — 99284 EMERGENCY DEPT VISIT MOD MDM: CPT

## 2024-07-08 PROCEDURE — 84443 ASSAY THYROID STIM HORMONE: CPT | Performed by: EMERGENCY MEDICINE

## 2024-07-08 PROCEDURE — 93005 ELECTROCARDIOGRAM TRACING: CPT

## 2024-07-08 PROCEDURE — 80053 COMPREHEN METABOLIC PANEL: CPT | Performed by: EMERGENCY MEDICINE

## 2024-07-08 PROCEDURE — 84484 ASSAY OF TROPONIN QUANT: CPT | Performed by: EMERGENCY MEDICINE

## 2024-07-08 PROCEDURE — 83880 ASSAY OF NATRIURETIC PEPTIDE: CPT | Performed by: EMERGENCY MEDICINE

## 2024-07-08 PROCEDURE — 71045 X-RAY EXAM CHEST 1 VIEW: CPT

## 2024-07-08 RX ORDER — ASPIRIN 81 MG/1
324 TABLET, CHEWABLE ORAL ONCE
Status: COMPLETED | OUTPATIENT
Start: 2024-07-08 | End: 2024-07-08

## 2024-07-08 RX ORDER — SUCRALFATE 1 G/1
1 TABLET ORAL 4 TIMES DAILY
Qty: 20 TABLET | Refills: 0 | Status: SHIPPED | OUTPATIENT
Start: 2024-07-08

## 2024-07-08 RX ORDER — FAMOTIDINE 20 MG/1
20 TABLET, FILM COATED ORAL 2 TIMES DAILY
COMMUNITY

## 2024-07-08 RX ORDER — SODIUM CHLORIDE 0.9 % (FLUSH) 0.9 %
10 SYRINGE (ML) INJECTION AS NEEDED
Status: DISCONTINUED | OUTPATIENT
Start: 2024-07-08 | End: 2024-07-09 | Stop reason: HOSPADM

## 2024-07-08 RX ORDER — PANTOPRAZOLE SODIUM 40 MG/1
40 TABLET, DELAYED RELEASE ORAL DAILY
Qty: 5 TABLET | Refills: 0 | Status: SHIPPED | OUTPATIENT
Start: 2024-07-08

## 2024-07-08 RX ADMIN — ASPIRIN 324 MG: 81 TABLET, CHEWABLE ORAL at 18:21

## 2024-07-08 NOTE — TELEPHONE ENCOUNTER
Caller: Gaston Hanna     Relationship: SELF    Best call back number: [phone]    What is your medical concern? PATIENT STATES HE WAS IN Religious GOT LIGHT HEADED AND HAD INDIGESTION, CHEST TIGHTNESS, AND DISCOMFORT WITH PRESSURE ON CHEEK BONES. NO ACTIVE CHEST PAIN WHILE ON PHONE TODAY. PATIENT DID GO [facility] AND GET CHECKED OUT.     How long has this issue been going on?  7-7-24    Is your provider already aware of this issue? NO    Have you been treated for this issue? YES PATIENT WENT TO ER. PATIENT WOULD LIKE FOLLOW UP APPOINTMENT, PLEASE REACH OUT.

## 2024-07-08 NOTE — ED PROVIDER NOTES
Subjective   History of Present Illness  Patient is a pleasant 34-year-old male who presents to the emergency department with palpitations mild chest discomfort.  He has a history of what he reports has had a V. tach 10 years ago which had an ablation performed.  Believes the ablation was done here at [facility].  Said he was on multiple medications many immediately after the procedure but these were weaned off he currently is only prescribed lisinopril.  He had does see the cardiology service here at [facility] and also has a cardiologist in [place] who he sees.  He states that over the past several months has been having frequent similar episodes to this.  He has seen his cardiologist and the episodes have been attributed to suspected anxiety.  Patient notes that his baseline heart rate is in the 40s and 50s which he is concerned about because it is low.  For these episodes he has all of the cardiology heart valve clinic just less than 1 month ago.  They discussed a Holter monitor but that that time patient decided not to have the Holter monitor monitor performed as he has had a Holter monitor performed in the past and most recent Ramya did not find any acute pathology on this evaluation.  He states that while at [facility] yesterday he experienced 1 episode and over the the vague lightheaded and vague palpitation symptoms are improved today, they are still present and this presents is what prompted his visit to the emergency department.  Denies fever, chills, abdominal pain, vomiting, diarrhea, or other acute complaints.      Review of Systems   All other systems reviewed and are negative.      Past Medical History:   Diagnosis Date    Anxiety state 06/11/2022    Essential hypertension 1/11/2024    Hyperlipidemia LDL goal <100 1/11/2024    Palpitations 06/11/2022       No Known Allergies    Past Surgical History:   Procedure Laterality Date    CARDIAC ABLATION      CHOLECYSTECTOMY         Family History   Problem  Relation Age of Onset    Cancer Mother         Breast CA    Diabetes Father        Social History     Socioeconomic History    Marital status:    Tobacco Use    Smoking status: Never    Smokeless tobacco: Never   Vaping Use    Vaping status: Never Used   Substance and Sexual Activity    Alcohol use: No    Drug use: No    Sexual activity: Defer           Objective   Physical Exam  Vitals and nursing note reviewed.   Constitutional:       General: He is not in acute distress.  HENT:      Head: Normocephalic and atraumatic.   Eyes:      Conjunctiva/sclera: Conjunctivae normal.      Pupils: Pupils are equal, round, and reactive to light.   Neck:      Thyroid: No thyromegaly.   Cardiovascular:      Rate and Rhythm: Normal rate and regular rhythm.      Heart sounds: Normal heart sounds. No murmur heard.     No friction rub. No gallop.   Pulmonary:      Effort: Pulmonary effort is normal. No respiratory distress.      Breath sounds: Normal breath sounds.   Chest:      Chest wall: No tenderness.   Abdominal:      Palpations: Abdomen is soft.      Tenderness: There is no abdominal tenderness.   Musculoskeletal:         General: Normal range of motion.      Cervical back: Normal range of motion and neck supple.      Right lower leg: No edema.      Left lower leg: No edema.   Lymphadenopathy:      Cervical: No cervical adenopathy.   Skin:     General: Skin is warm and dry.      Capillary Refill: Capillary refill takes less than 2 seconds.   Neurological:      General: No focal deficit present.      Mental Status: He is alert and oriented to person, place, and time.         Procedures           ED Course       Latest Reference Range & Units 07/08/24 18:22   HS Troponin T <22 ng/L <6   proBNP 0.0 - 450.0 pg/mL <36.0   Sodium 136 - 145 mmol/L 140   Potassium 3.5 - 5.2 mmol/L 4.0   Chloride 98 - 107 mmol/L 102   CO2 22.0 - 29.0 mmol/L 31.0 (H)   Anion Gap 5.0 - 15.0 mmol/L 7.0   BUN 6 - 20 mg/dL 13   Creatinine 0.76 - 1.27  mg/dL 1.20   BUN/Creatinine Ratio 7.0 - 25.0  10.8   eGFR >60.0 mL/min/1.73 82.4   Glucose 65 - 99 mg/dL 97   Calcium 8.6 - 10.5 mg/dL 9.2   Magnesium 1.6 - 2.6 mg/dL 2.0   Alkaline Phosphatase 39 - 117 U/L 58   Total Protein 6.0 - 8.5 g/dL 7.2   Albumin 3.5 - 5.2 g/dL 4.4   Globulin gm/dL 2.8   A/G Ratio g/dL 1.6   AST (SGOT) 1 - 40 U/L 17   ALT (SGPT) 1 - 41 U/L 12   Total Bilirubin 0.0 - 1.2 mg/dL 1.6 (H)   TSH Baseline 0.270 - 4.200 uIU/mL 1.380   Lipase 13 - 60 U/L 24   D-Dimer, Quant 0.00 - 0.50 MCGFEU/mL <0.27   WBC 3.40 - 10.80 10*3/mm3 6.54   RBC 4.14 - 5.80 10*6/mm3 5.06   Hemoglobin 13.0 - 17.7 g/dL 15.6   Hematocrit 37.5 - 51.0 % 45.0   Platelets 140 - 450 10*3/mm3 182   RDW 12.3 - 15.4 % 12.5   MCV 79.0 - 97.0 fL 88.9   MCH 26.6 - 33.0 pg 30.8   MCHC 31.5 - 35.7 g/dL 34.7   MPV 6.0 - 12.0 fL 12.4 (H)   RDW-SD 37.0 - 54.0 fl 41.0   Neutrophil Rel % 42.7 - 76.0 % 49.9   Lymphocyte Rel % 19.6 - 45.3 % 43.1   Monocyte Rel % 5.0 - 12.0 % 5.5   Eosinophil Rel % 0.3 - 6.2 % 1.1   Basophil Rel % 0.0 - 1.5 % 0.2   Immature Granulocyte Rel % 0.0 - 0.5 % 0.2   Neutrophils Absolute 1.70 - 7.00 10*3/mm3 3.27   Lymphocytes Absolute 0.70 - 3.10 10*3/mm3 2.82   Monocytes Absolute 0.10 - 0.90 10*3/mm3 0.36   Eosinophils Absolute 0.00 - 0.40 10*3/mm3 0.07   Basophils Absolute 0.00 - 0.20 10*3/mm3 0.01   Immature Grans, Absolute 0.00 - 0.05 10*3/mm3 0.01   nRBC 0.0 - 0.2 /100 WBC 0.0   (H): Data is abnormally high      XR Chest 1 View   Final Result   Impression: No acute cardiopulmonary disease.         Electronically Signed: Rm Herring MD     7/8/2024 6:58 PM EDT     Workstation ID: [id number]        Vitals:    07/08/24 1755 07/08/24 2030 07/08/24 2102 07/08/24 2132   BP: 151/94 131/87 121/88 120/75   BP Location: Right arm      Patient Position: Sitting      Pulse: (!) 48 (!) 49 52 (!) 46   Resp: 18      Temp: 97.9 °F (36.6 °C)      TempSrc: Oral      SpO2: 99% 99% 97% 98%   Weight: 111 kg (245 lb)      Height: 177.8  "cm (70\")        Medications   aspirin chewable tablet 324 mg (324 mg Oral Given 7/8/24 1821)     ECG/EMG Results (last 24 hours)       ** No results found for the last 24 hours. **          ECG 12 Lead ED Triage Standing Order; Chest Pain   Preliminary Result   Test Reason : ED Triage Standing Order~   Blood Pressure :   */*   mmHG   Vent. Rate :  40 BPM     Atrial Rate :  40 BPM      P-R Int : 166 ms          QRS Dur :  86 ms       QT Int : 442 ms       P-R-T Axes :  37  14  17 degrees      QTc Int : 360 ms      Marked sinus bradycardia   Abnormal ECG   When compared with ECG of 08-JUL-2024 18:01, (Unconfirmed)   No significant change was found      Referred By: OTTO           Confirmed By:       ECG 12 Lead ED Triage Standing Order; Chest Pain   Preliminary Result   Test Reason : ED Triage Standing Order~   Blood Pressure :   */*   mmHG   Vent. Rate :  49 BPM     Atrial Rate :  49 BPM      P-R Int : 152 ms          QRS Dur :  76 ms       QT Int : 414 ms       P-R-T Axes :  31  24  25 degrees      QTc Int : 373 ms      Sinus bradycardia   Otherwise normal ECG   When compared with ECG of 15-SHELLI-2024 17:54,   No significant change was found      Referred By: STEPHANIE           Confirmed By:           f                                       Medical Decision Making  Problems Addressed:  Chest discomfort: complicated acute illness or injury  Sinus bradycardia: complicated acute illness or injury    Amount and/or Complexity of Data Reviewed  Labs: ordered. Decision-making details documented in ED Course.  Radiology: ordered and independent interpretation performed. Decision-making details documented in ED Course.  ECG/medicine tests: ordered and independent interpretation performed. Decision-making details documented in ED Course.    Risk  OTC drugs.  Prescription drug management.        Final diagnoses:   Chest discomfort   Sinus bradycardia       ED Disposition  ED Disposition       ED Disposition   Discharge    Condition "   Stable    Comment   --               DISCHARGE    Patient discharged in stable condition.    Reviewed implications of results, diagnosis, meds, responsibility to follow up, warning signs and symptoms of possible worsening, potential complications and reasons to return to ER.    Patient/Family voiced understanding of above instructions.    Discussed plan for discharge, as there is no emergent indication for admission.  Pt/family is agreeable and understands need for follow up and possible repeat testing.  Pt/family is aware that discharge does not mean that nothing is wrong but that it indicates no emergency is currently present that requires admission and they must continue care with follow-up as given below or with a physician of their choice.       FOLLOW-UP  [facility] CARDIOLOGY  [address]  [place] [facility] [address] [phone]  Schedule an appointment as soon as possible for a visit       [facility] EMERGENCY DEPARTMENT  [address] [address] [phone]    If symptoms worsen    Your Primary Care Provider    Schedule an appointment as soon as possible for a visit            Medication List        New Prescriptions      pantoprazole 40 MG EC tablet  Commonly known as: PROTONIX  Take 1 tablet by mouth Daily.     sucralfate 1 g tablet  Commonly known as: CARAFATE  Take 1 tablet by mouth 4 (Four) Times a Day.               Where to Get Your Medications        These medications were sent to [id number] - [place] - [address] AT [address] [phone]  - [phone]   [address] [place] [address]      Phone: [phone]   pantoprazole 40 MG EC tablet  sucralfate 1 g tablet            Alban Ruiz DO  07/10/24 1748

## 2024-07-10 LAB
QT INTERVAL: 414 MS
QT INTERVAL: 442 MS
QTC INTERVAL: 360 MS
QTC INTERVAL: 373 MS

## 2024-07-15 ENCOUNTER — OFFICE VISIT (OUTPATIENT)
Dept: CARDIOLOGY | Facility: HOSPITAL | Age: 34
End: 2024-07-15
Payer: MEDICAID

## 2024-07-15 VITALS
OXYGEN SATURATION: 97 % | DIASTOLIC BLOOD PRESSURE: 86 MMHG | SYSTOLIC BLOOD PRESSURE: 136 MMHG | HEART RATE: 44 BPM | BODY MASS INDEX: 35.76 KG/M2 | WEIGHT: 249.2 LBS

## 2024-07-15 DIAGNOSIS — R07.2 PRECORDIAL PAIN: Primary | ICD-10-CM

## 2024-07-15 DIAGNOSIS — I47.29 VENTRICULAR TACHYCARDIA (PAROXYSMAL): ICD-10-CM

## 2024-07-15 DIAGNOSIS — R00.1 BRADYCARDIA, SINUS: ICD-10-CM

## 2024-07-15 DIAGNOSIS — R00.2 PALPITATIONS: ICD-10-CM

## 2024-07-15 PROCEDURE — 3079F DIAST BP 80-89 MM HG: CPT | Performed by: NURSE PRACTITIONER

## 2024-07-15 PROCEDURE — 3075F SYST BP GE 130 - 139MM HG: CPT | Performed by: NURSE PRACTITIONER

## 2024-07-15 PROCEDURE — 99214 OFFICE O/P EST MOD 30 MIN: CPT | Performed by: NURSE PRACTITIONER

## 2024-07-15 PROCEDURE — 1159F MED LIST DOCD IN RCRD: CPT | Performed by: NURSE PRACTITIONER

## 2024-07-15 PROCEDURE — 1160F RVW MEDS BY RX/DR IN RCRD: CPT | Performed by: NURSE PRACTITIONER

## 2024-07-15 NOTE — PROGRESS NOTES
Chief Complaint  Chest Pain (ED follow-up)    Subjective      History of Present Illness {CC  Problem List  Visit  Diagnosis   Encounters  Notes  Medications  Labs  Result Review Imaging  Media :23}     Gaston Hanna, 34 y.o. male with hypertension, hyperlipidemia, and ventricular tachycardia s/p ablation (2015-Dr. Oliveros) presents to [facility] Heart and Valve clinic for Chest Pain (ED follow-up).    Patient was recently evaluated at [facility] emergency department for complaints of chest discomfort.  Emergency department work-up revealed normal troponin/BNP/D-dimer, CXR with no acute cardiopulmonary findings, and ECGs with bradycardia/no acute ischemia. Patient was instructed to follow-up at [facility] heart and valve clinic for further evaluation.  He was prescribed pantoprazole and Carafate upon emergency department discharge.  Follows with [facility] cardiology.    Patient presents today with improvement of chest pain since emergency department evaluation. Notes that chest pain symptom onset was one week ago while seated in [employer], and symptoms have been improved since that time. Notes ongoing lightheadedness/jaw pressure/back pain for the past couple of years. No recent illness. Symptoms include chest pressure/tightness, with mild shortness of breath. Discomfort/pain duration approximately 30-60 minutes. Generally no exertional chest pain symptoms. Occasional lightheadedness; no syncope. Associated symptoms include: indigestion/brain fogginess sensation. 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 Heart rate generally 60s, but occasionally into the upper 40s recently; heart rate in the 40s since earlier this year. Prior VT symptoms were tachypalpitation, no recent symptoms similar to VT episodes. Previous cardiac testing includes: myocardial perfusion study and CTA 2022. Family history for premature cardiac disease includes: none.   Patient is a former smoker, quit 8y ago at time of ablation. Requesting referral to [facility] cardiology.       Objective     Vital Signs:   Vitals:    07/15/24 1052   BP: 136/86   Pulse: (!) 44   SpO2: 97%   Weight: 113 kg (249 lb 3.2 oz)     Body mass index is 35.76 kg/m².  Physical Exam  Vitals and nursing note reviewed.   Constitutional:       Appearance: Normal appearance.   HENT:      Head: Normocephalic.   Eyes:      Extraocular Movements: Extraocular movements intact.   Neck:      Vascular: No carotid bruit.   Cardiovascular:      Rate and Rhythm: Normal rate and regular rhythm.      Pulses: Normal pulses.      Heart sounds: Normal heart sounds, S1 normal and S2 normal. No murmur heard.  Pulmonary:      Effort: Pulmonary effort is normal. No respiratory distress.      Breath sounds: Normal breath sounds.   Musculoskeletal:      Cervical back: Neck supple.      Right lower leg: No edema.      Left lower leg: No edema.   Skin:     General: Skin is warm and dry.   Neurological:      General: No focal deficit present.      Mental Status: He is alert.   Psychiatric:         Mood and Affect: Mood normal.         Behavior: Behavior normal.         Thought Content: Thought content normal.        Data Reviewed:{ Labs  Result Review  Imaging  Med Tab  Media :23}     High Sensitivity Troponin T 2Hr (07/08/2024 20:29)  D-dimer, Quantitative (07/08/2024 18:22)  Magnesium (07/08/2024 18:22)  TSH (07/08/2024 18:22)  BNP (07/08/2024 18:22)  Comprehensive Metabolic Panel (07/08/2024 18:22)  CBC & Differential (07/08/2024 18:22)  High Sensitivity Troponin T (07/08/2024 18:22)  XR Chest 1 View (07/08/2024 18:47)   ECG 12 Lead ED Triage Standing Order; Chest Pain (07/08/2024 20:31)  ECG 12 Lead ED Triage Standing Order; Chest Pain (07/08/2024 18:01)  Lipid Panel (01/09/2024 08:38)     Stress Test With Myocardial Perfusion One Day (06/12/2022 10:51)  CT Angiogram Coronary (08/24/2022 11:36)  Adult Transthoracic Echo  Complete W/ Cont if Necessary Per Protocol (06/11/2022 16:29)       Assessment & Plan   Assessment and Plan {CC Problem List  Visit Diagnosis  ROS  Review (Popup)  [facility]  Quality  BestPractice  Medications  SmartSets  SnapShot Encounters  Media :23}     1. Precordial pain  -Intermittent nonexertional chest pressure/discomfort  -ED workup with negative troponin/BNP, ECG without acute ischemic changes  -Previous myocardial perfusion study 2022 with normal perfusion, no evidence of ischemia.  -Previous coronary CTA 2022 with total calcium score of 0.0. No detectable coronary artery calcium. No evidence of significant coronary artery anomaly.  -CECILIO score: 0.   -Given new onset symptoms will complete treadmill stress test for ischemic evaluation/arrhythmia monitoring  - Adult Transthoracic Echo Complete W/ Cont if Necessary Per Protocol; Future  - Treadmill Stress Test; Future  - Ambulatory Referral to Cardiology; requests referral to [facility] cardiology group    2. Bradycardia, sinus  -Intermittent bradycardia. Does note that heart rate does increase/augment. Denies syncope.  - Holter Monitor - 14 Days; Future  - Adult Transthoracic Echo Complete W/ Cont if Necessary Per Protocol; Future  -Treadmill stress test as above for arrhythmia surveillance/heart rate augmentation  -Discussed maintaining adequate hydration to avoid vagal nerve triggers.  - Ambulatory Referral to Cardiology; requests referral to [facility] cardiology group    3. Ventricular tachycardia (paroxysmal)/palpitations  -S/p ventricular tachycardia 2015 with Dr. Oliveros  -Intermittent palpitation symptom, generally not consistent with prior VT symptoms. Denies syncope.  -Treadmill stress test, TTE, Holter monitor as above  - Ambulatory Referral to Cardiology; requests referral to [facility] cardiology group      Follow Up {Instructions Charge Capture  Follow-up Communications :23}      Return if symptoms worsen or fail to improve.      Patient was given instructions and counseling regarding his condition or for health maintenance advice. Please see specific information pulled into the AVS if appropriate.  Patient was instructed to call the Heart and Valve Center with any questions, concerns, or worsening symptoms.    Dictated Utilizing Dragon Dictation   Please note that portions of this note were completed with a voice recognition program.   Part of this note may be an electronic transcription/translation of spoken language to printed text using the Dragon Dictation System.

## 2024-07-15 NOTE — PATIENT INSTRUCTIONS
- Heart monitor for 2 weeks. Monitor will ship to you. Contact clinic if you do not receive it by Friday.    - Office will schedule testing  - Office will call or send message with testing results    - Cardiology department (Dr. Cortés) will call for appointment

## 2024-07-17 ENCOUNTER — OFFICE VISIT (OUTPATIENT)
Dept: FAMILY MEDICINE CLINIC | Facility: CLINIC | Age: 34
End: 2024-07-17
Payer: MEDICAID

## 2024-07-17 VITALS
WEIGHT: 251.8 LBS | SYSTOLIC BLOOD PRESSURE: 124 MMHG | DIASTOLIC BLOOD PRESSURE: 78 MMHG | HEART RATE: 95 BPM | HEIGHT: 70 IN | TEMPERATURE: 98.4 F | OXYGEN SATURATION: 98 % | BODY MASS INDEX: 36.05 KG/M2

## 2024-07-17 DIAGNOSIS — R00.1 SYMPTOMATIC BRADYCARDIA: Primary | ICD-10-CM

## 2024-07-17 DIAGNOSIS — F41.1 GAD (GENERALIZED ANXIETY DISORDER): ICD-10-CM

## 2024-07-17 DIAGNOSIS — R00.2 PALPITATIONS: ICD-10-CM

## 2024-07-17 PROCEDURE — 99213 OFFICE O/P EST LOW 20 MIN: CPT | Performed by: FAMILY MEDICINE

## 2024-07-17 PROCEDURE — 3078F DIAST BP <80 MM HG: CPT | Performed by: FAMILY MEDICINE

## 2024-07-17 PROCEDURE — 3074F SYST BP LT 130 MM HG: CPT | Performed by: FAMILY MEDICINE

## 2024-07-17 RX ORDER — ESCITALOPRAM OXALATE 10 MG/1
10 TABLET ORAL DAILY
Qty: 90 TABLET | Refills: 0 | Status: SHIPPED | OUTPATIENT
Start: 2024-07-17

## 2024-07-18 ENCOUNTER — HOSPITAL ENCOUNTER (OUTPATIENT)
Dept: CARDIOLOGY | Facility: HOSPITAL | Age: 34
Discharge: HOME | End: 2024-07-18
Admitting: NURSE PRACTITIONER
Payer: MEDICAID

## 2024-07-18 DIAGNOSIS — R00.2 PALPITATIONS: ICD-10-CM

## 2024-07-18 DIAGNOSIS — R00.1 BRADYCARDIA, SINUS: ICD-10-CM

## 2024-07-18 PROCEDURE — 93242 EXT ECG>48HR<7D RECORDING: CPT

## 2024-07-22 ENCOUNTER — TELEPHONE (OUTPATIENT)
Dept: CARDIOLOGY | Facility: HOSPITAL | Age: 34
End: 2024-07-22
Payer: MEDICAID

## 2024-07-22 NOTE — TELEPHONE ENCOUNTER
----- Message from Gaston Oneal sent at 7/22/2024  8:13 AM EDT -----  Regarding: Echocardiogram results  Could you let Rafa know:     Echocardiogram results reviewed (completed at Dr. Bates's office).  Echocardiogram with normal heart contractility, no significant valvular abnormality. Would recommend continuing plan to establish with [facility] cardiology.    Thanks

## 2024-07-29 ENCOUNTER — TELEPHONE (OUTPATIENT)
Dept: CARDIOLOGY | Facility: HOSPITAL | Age: 34
End: 2024-07-29
Payer: MEDICAID

## 2024-07-29 NOTE — TELEPHONE ENCOUNTER
----- Message from Gaston Oneal sent at 7/29/2024 10:28 AM EDT -----  Regarding: Stress test results  Could you let Rafa know:      Exercise stress test results reviewed (completed at Dr. Bates's office).  Normal stress test reported.    Would recommend continuing plan to establish with [facility] cardiology.     Thanks

## 2024-07-29 NOTE — TELEPHONE ENCOUNTER
Patient notified of stress test results and encouraged to establish care with [facility].

## 2024-08-06 ENCOUNTER — OFFICE VISIT (OUTPATIENT)
Dept: FAMILY MEDICINE CLINIC | Facility: CLINIC | Age: 34
End: 2024-08-06

## 2024-08-06 VITALS
DIASTOLIC BLOOD PRESSURE: 76 MMHG | TEMPERATURE: 98.4 F | HEIGHT: 70 IN | SYSTOLIC BLOOD PRESSURE: 130 MMHG | BODY MASS INDEX: 35.93 KG/M2 | HEART RATE: 87 BPM | OXYGEN SATURATION: 98 % | WEIGHT: 251 LBS

## 2024-08-06 DIAGNOSIS — F41.1 GAD (GENERALIZED ANXIETY DISORDER): Primary | ICD-10-CM

## 2024-08-06 DIAGNOSIS — R53.82 CHRONIC FATIGUE: ICD-10-CM

## 2024-08-06 DIAGNOSIS — R68.89 HEAT INTOLERANCE: ICD-10-CM

## 2024-08-06 DIAGNOSIS — R00.2 PALPITATIONS: ICD-10-CM

## 2024-08-06 PROCEDURE — 99214 OFFICE O/P EST MOD 30 MIN: CPT | Performed by: FAMILY MEDICINE

## 2024-08-06 NOTE — PROGRESS NOTES
Lety Hanna is a 34 y.o. male.   Pt presents today with CC of Fatigue      History of Present Illness   History of Present Illness  Patient is a generally healthy 34-year-old male with anxiety.  He never started Lexapro but is willing to do so.  #2 he complains of heat intolerance and severe fatigue after being outside working in the hot sun.  He indexes have been greater than 100 for much of the last month.  The hottest days resulted in the worst symptoms.  Also, he reports flushing around his ears when he takes hot showers.  Denies weakness or numbness, though generally becomes very fatigued.  He has had cardiac evaluation, per report from patient, cardiologist cleared him.  Will get records       The following portions of the patient's history were reviewed and updated as appropriate: allergies, current medications, past family history, past medical history, past social history, past surgical history, and problem list.    Review of Systems   Constitutional:  Negative for chills, fever and unexpected weight loss.   HENT:  Negative for congestion and sore throat.    Eyes:  Negative for blurred vision and visual disturbance.   Respiratory:  Negative for cough and wheezing.    Cardiovascular:  Negative for chest pain and palpitations.   Gastrointestinal:  Negative for abdominal pain and diarrhea.   Endocrine: Positive for heat intolerance. Negative for cold intolerance.   Genitourinary:  Negative for dysuria.   Musculoskeletal:  Negative for arthralgias and neck stiffness.   Neurological:  Negative for dizziness, seizures and syncope.   Psychiatric/Behavioral:  Negative for self-injury, suicidal ideas and depressed mood.      Vitals:    08/06/24 0944   BP: 130/76   Pulse: 87   Temp: 98.4 °F (36.9 °C)   SpO2: 98%        Objective   Physical Exam  Vitals and nursing note reviewed.   Constitutional:       Appearance: He is well-developed.   HENT:      Head: Normocephalic and atraumatic.      Right  Ear: External ear normal.      Left Ear: External ear normal.      Nose: Nose normal.   Eyes:      Conjunctiva/sclera: Conjunctivae normal.      Pupils: Pupils are equal, round, and reactive to light.   Cardiovascular:      Rate and Rhythm: Normal rate and regular rhythm.      Heart sounds: Normal heart sounds.   Pulmonary:      Effort: Pulmonary effort is normal.      Breath sounds: Normal breath sounds.   Abdominal:      General: Bowel sounds are normal.      Palpations: Abdomen is soft.   Musculoskeletal:      Cervical back: Normal range of motion and neck supple.   Skin:     General: Skin is warm and dry.   Neurological:      Mental Status: He is alert and oriented to person, place, and time.      Comments: Shoulders, elbows, and wrists with full range of motion and 5/5 strength bilaterally. DTRs symmetrical. Neck with full range of motion in flexion, extension, side-bending, and rotation.  Normal gait.  DTRs symmetrical in lower extremity. Straight leg raise test negative bilaterally.  Normal neurologic exam   Psychiatric:         Behavior: Behavior normal.           Assessment & Plan   Diagnoses and all orders for this visit:    1. JANIE (generalized anxiety disorder) (Primary)  He has not started Lexapro.  I recommend that he does so.  He is going to start it in the morning.  Anxiety may not be the primary cause for his fatigue out in the heat, though anxiety is not helping.  He should start the medication.  I recommend follow-up at the end of the month to see how he is doing on Lexapro.  2. Palpitations  -     Comprehensive metabolic panel; Future  Holter monitor, echocardiogram, and stress test are all reported as normal.  Of note, his echo and stress test were done at a cardiac clinic in [place], I do not have the records available, though he assures that everything was normal.  3. Heat intolerance  -     Comprehensive metabolic panel; Future  Heat index has been greater than 100 degrees for much of the  past month.  This is contributing.  However, he reports he is always worked in the heat, and it is clearly worse than previously.  Denies syncope or presyncope, though begins getting very fatigued after being out in the sun for couple of hours.  I recommend regular hydration with electrolyte infused drinks without caffeine.  Options discussed.  His labs have been normal over the last several months when he has been checked a couple of times.  CMP ordered, though he is not can have it done today.  If he is out working in the sun, and becomes very fatigued stop working, and the lab will be available to be drawn if needed.  He will follow-up after lab draw.  Heat, humidity, and in a couple of instances hot showers have fatigue and sudden tiredness.  Also, he reports that his ears become flushed with hot shower.  At this time, I do not suspect neurologic disorder, though it is on the differential.  Neurologic exam was normal today.  4. Chronic fatigue  -     Comprehensive metabolic panel; Future                          This document has been electronically signed by Latia Lorenzo  August 6, 2024 09:46 EDT    Dictated Utilizing Dragon Dictation: Part of this note may be an electronic transcription/translation of spoken language to printed text using the Dragon Dictation System.

## 2024-08-21 ENCOUNTER — DOCUMENTATION (OUTPATIENT)
Dept: CARDIOLOGY | Facility: HOSPITAL | Age: 34
End: 2024-08-21

## 2024-08-21 NOTE — PROGRESS NOTES
Cancellation notification receive on patient's holter monitor study. Reason given: inconvenience, declined.

## 2024-09-26 ENCOUNTER — TELEPHONE (OUTPATIENT)
Dept: FAMILY MEDICINE CLINIC | Facility: CLINIC | Age: 34
End: 2024-09-26

## 2024-12-12 ENCOUNTER — OFFICE VISIT (OUTPATIENT)
Dept: FAMILY MEDICINE CLINIC | Facility: CLINIC | Age: 34
End: 2024-12-12

## 2024-12-12 VITALS
DIASTOLIC BLOOD PRESSURE: 76 MMHG | SYSTOLIC BLOOD PRESSURE: 124 MMHG | BODY MASS INDEX: 37.05 KG/M2 | RESPIRATION RATE: 16 BRPM | TEMPERATURE: 97.5 F | HEIGHT: 70 IN | WEIGHT: 258.8 LBS | HEART RATE: 91 BPM | OXYGEN SATURATION: 97 %

## 2024-12-12 DIAGNOSIS — J40 BRONCHITIS: Primary | ICD-10-CM

## 2024-12-12 RX ORDER — AZITHROMYCIN 250 MG/1
TABLET, FILM COATED ORAL
Qty: 6 TABLET | Refills: 0 | Status: SHIPPED | OUTPATIENT
Start: 2024-12-12

## 2024-12-13 NOTE — PROGRESS NOTES
Lety Hanna is a 34 y.o. male.   Pt presents today with CC of Cough, Nasal Congestion, and URI      History of Present Illness   History of Present Illness  Patient is a 34-year-old male with cough, nasal congestion, and generally not feeling well over the past 2 weeks.  He reports that he thought that he was getting better, though his cough has worsened again this week.  He request antibiotic.     The following portions of the patient's history were reviewed and updated as appropriate: allergies, current medications, past family history, past medical history, past social history, past surgical history, and problem list.    Review of Systems   Constitutional:  Negative for chills, fever and unexpected weight loss.   HENT:  Positive for congestion and rhinorrhea. Negative for sore throat.    Eyes:  Negative for blurred vision and visual disturbance.   Respiratory:  Positive for cough. Negative for wheezing.    Cardiovascular:  Negative for chest pain and palpitations.   Gastrointestinal:  Negative for abdominal pain and diarrhea.   Endocrine: Negative for cold intolerance and heat intolerance.   Genitourinary:  Negative for dysuria.   Musculoskeletal:  Negative for arthralgias and neck stiffness.   Neurological:  Negative for dizziness, seizures and syncope.   Psychiatric/Behavioral:  Negative for self-injury, suicidal ideas and depressed mood.      Vitals:    12/12/24 1101   BP: 124/76   Pulse: 91   Resp: 16   Temp: 97.5 °F (36.4 °C)   SpO2: 97%        Objective   Physical Exam  Vitals and nursing note reviewed.   Constitutional:       Appearance: He is well-developed.   HENT:      Head: Normocephalic and atraumatic.      Right Ear: External ear normal.      Left Ear: External ear normal.      Nose: Nose normal.   Eyes:      General: No scleral icterus.     Conjunctiva/sclera: Conjunctivae normal.      Pupils: Pupils are equal, round, and reactive to light.   Neck:      Thyroid: No thyromegaly.    Cardiovascular:      Rate and Rhythm: Normal rate and regular rhythm.      Heart sounds: Normal heart sounds. No murmur heard.  Pulmonary:      Effort: Pulmonary effort is normal. No respiratory distress.      Breath sounds: Normal breath sounds. No wheezing or rales.   Abdominal:      General: Bowel sounds are normal.      Palpations: Abdomen is soft.      Tenderness: There is no abdominal tenderness.   Musculoskeletal:      Cervical back: Normal range of motion and neck supple.   Lymphadenopathy:      Cervical: No cervical adenopathy.   Skin:     General: Skin is warm and dry.      Findings: No rash.   Neurological:      Mental Status: He is alert and oriented to person, place, and time.   Psychiatric:         Behavior: Behavior normal.         Assessment & Plan   Diagnoses and all orders for this visit:    1. Bronchitis (Primary)  -     azithromycin (Zithromax Z-Ryan) 250 MG tablet; Take 2 tablets the first day, then 1 tablet daily for 4 days.  Dispense: 6 tablet; Refill: 0  He reports cough, nasal congestion, and upper respiratory illness that has been worsening over the last couple weeks.  He request antibiotic.  At this point, I think this is reasonable.  I discussed the side effects with him and he was agreeable to proceed.                           This document has been electronically signed by Ace Abdalla DO  December 13, 2024 13:11 EST    Dictated Utilizing Dragon Dictation: Part of this note may be an electronic transcription/translation of spoken language to printed text using the Dragon Dictation System.

## 2025-01-17 ENCOUNTER — OFFICE VISIT (OUTPATIENT)
Dept: FAMILY MEDICINE CLINIC | Facility: CLINIC | Age: 35
End: 2025-01-17
Payer: MEDICAID

## 2025-01-17 VITALS
WEIGHT: 261.6 LBS | HEIGHT: 70 IN | DIASTOLIC BLOOD PRESSURE: 82 MMHG | SYSTOLIC BLOOD PRESSURE: 144 MMHG | BODY MASS INDEX: 37.45 KG/M2 | OXYGEN SATURATION: 99 % | HEART RATE: 90 BPM | TEMPERATURE: 97.9 F

## 2025-01-17 DIAGNOSIS — F41.1 GAD (GENERALIZED ANXIETY DISORDER): ICD-10-CM

## 2025-01-17 DIAGNOSIS — I10 ESSENTIAL HYPERTENSION: Chronic | ICD-10-CM

## 2025-01-17 DIAGNOSIS — M54.2 MUSCLE PAIN, CERVICAL: Primary | ICD-10-CM

## 2025-01-17 DIAGNOSIS — M54.2 CERVICALGIA: ICD-10-CM

## 2025-01-17 PROCEDURE — 99214 OFFICE O/P EST MOD 30 MIN: CPT | Performed by: NURSE PRACTITIONER

## 2025-01-17 RX ORDER — IBUPROFEN 800 MG/1
800 TABLET, FILM COATED ORAL EVERY 8 HOURS PRN
Qty: 30 TABLET | Refills: 0 | Status: SHIPPED | OUTPATIENT
Start: 2025-01-17 | End: 2025-01-27

## 2025-01-17 RX ORDER — BACLOFEN 10 MG/1
TABLET ORAL
Qty: 21 TABLET | Refills: 0 | Status: SHIPPED | OUTPATIENT
Start: 2025-01-17

## 2025-01-21 NOTE — PROGRESS NOTES
[facility]     Chief Complaint  Headache (Pt reports feeling pressure in back of the head. )    Gaston Hanna is a 34 y.o. male who presents today to [facility] for Headache (Pt reports feeling pressure in back of the head. ).    HPI:   Headache     History of Present Illness  The patient presents for evaluation of neck pressure, elevated blood pressure, and anxiety.    He has been experiencing intermittent pressure in his neck for the past 1 to 2 weeks, which he describes as a constant, light sensation. He has a history of sinus infections but reports that this current symptom is unlike any previous sinus-related discomfort. The pressure is not associated with pain and can be temporarily relieved by certain head movements or by pinching the back of his neck. He has not attempted to manage the symptom with ibuprofen or a heating pad. He spends significant time working at a computer and acknowledges a need to improve his posture. He also reports a lack of flexibility on one side of his neck compared to the other. He had a sinus infection last month, accompanied by a cough that persisted for a month but has since resolved.    He has a history of hypertension, which he managed through exercise, but notes a recent weight gain due to decreased physical activity over the holidays. He is self-pay and prefers to avoid medication. He monitors his blood pressure at home, which typically ranges between 130 and 140, occasionally dropping to 120 when he is relaxed. He believes his blood pressure is well-controlled when he maintains a healthy diet, although he admits to recent dietary indiscretions. He acknowledges the need to return to the gym and achieve a healthier weight.    He has a history of anxiety. He believes his anxiety may be contributing to his elevated blood pressure. He has found some relief from chewing gum and prefers to manage his health naturally whenever  "possible.    He experiences migraines, which are infrequent and often triggered by irregular sleep patterns. The first instance of the neck pressure was accompanied by a migraine, but recent episodes have not been associated with any other symptoms.        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    Previous History:   Past Medical History:   Diagnosis Date    Anxiety state 06/11/2022    Essential hypertension 1/11/2024    Hyperlipidemia LDL goal <100 1/11/2024    Palpitations 06/11/2022      Past Surgical History:   Procedure Laterality Date    CARDIAC ABLATION      CHOLECYSTECTOMY        Social History     Socioeconomic History    Marital status:    Tobacco Use    Smoking status: Never    Smokeless tobacco: Never   Vaping Use    Vaping status: Never Used   Substance and Sexual Activity    Alcohol use: No    Drug use: No    Sexual activity: Defer      Health Maintenance Due   Topic Date Due    TDAP/TD VACCINES (1 - Tdap) Never done    HEPATITIS C SCREENING  Never done    ANNUAL PHYSICAL  Never done    COVID-19 Vaccine (1 - 2024-25 season) Never done    LIPID PANEL  01/09/2025        Current Medications:  Current Outpatient Medications   Medication Sig Dispense Refill    baclofen (LIORESAL) 10 MG tablet Take 1/2 to 1 whole tab as needed for muscle spasm or pain up to three times per day (May cause drowsiness) 21 tablet 0    ibuprofen (ADVIL,MOTRIN) 800 MG tablet Take 1 tablet by mouth Every 8 (Eight) Hours As Needed for Mild Pain, Headache or Moderate Pain for up to 10 days. 30 tablet 0     No current facility-administered medications for this visit.       Allergies:   No Known Allergies    Vitals:   /82 (BP Location: Right arm, Patient Position: Sitting)   Pulse 90   Temp 97.9 °F (36.6 °C) (Temporal)   Ht 177.8 cm (70\")   Wt 119 kg (261 lb 9.6 oz)   SpO2 99%   BMI 37.54 kg/m²   Estimated body mass index is 37.54 kg/m² as calculated from " "the following:    Height as of this encounter: 177.8 cm (70\").    Weight as of this encounter: 119 kg (261 lb 9.6 oz).               Physical Exam:   Physical Exam  Vitals reviewed.   Constitutional:       Appearance: Normal appearance.   HENT:      Head: Normocephalic.      Right Ear: Tympanic membrane and ear canal normal.      Left Ear: Tympanic membrane and ear canal normal.      Nose: Nose normal.      Mouth/Throat:      Mouth: Mucous membranes are moist.      Pharynx: Oropharynx is clear.   Eyes:      Extraocular Movements: Extraocular movements intact.      Conjunctiva/sclera: Conjunctivae normal.   Neck:     Cardiovascular:      Rate and Rhythm: Normal rate.      Heart sounds: Normal heart sounds.   Pulmonary:      Effort: Pulmonary effort is normal.      Breath sounds: Normal breath sounds.   Abdominal:      General: Bowel sounds are normal.      Palpations: Abdomen is soft.   Musculoskeletal:      Cervical back: Tenderness present. Pain with movement present. Decreased range of motion.   Lymphadenopathy:      Cervical: No cervical adenopathy.   Skin:     General: Skin is warm and dry.   Neurological:      Mental Status: He is alert and oriented to person, place, and time. Mental status is at baseline.      Cranial Nerves: No cranial nerve deficit.      Comments: Normal gait    Psychiatric:         Attention and Perception: Attention normal.         Mood and Affect: Mood is anxious.         Behavior: Behavior normal.         Thought Content: Thought content normal.         Judgment: Judgment normal.        Physical Exam  No swollen lymph nodes in the neck.    Vital Signs  Blood pressure is slightly elevated today.       Lab Results:   No visits with results within 3 Month(s) from this visit.   Latest known visit with results is:   Admission on 07/08/2024, Discharged on 07/08/2024   Component Date Value Ref Range Status    QT Interval 07/08/2024 414  ms Final    QTC Interval 07/08/2024 373  ms Final    QT " Interval 07/08/2024 442  ms Final    QTC Interval 07/08/2024 360  ms Final    HS Troponin T 07/08/2024 <6  <22 ng/L Final    Glucose 07/08/2024 97  65 - 99 mg/dL Final    BUN 07/08/2024 13  6 - 20 mg/dL Final    Creatinine 07/08/2024 1.20  0.76 - 1.27 mg/dL Final    Sodium 07/08/2024 140  136 - 145 mmol/L Final    Potassium 07/08/2024 4.0  3.5 - 5.2 mmol/L Final    Slight hemolysis detected by analyzer. Result may be falsely elevated.    Chloride 07/08/2024 102  98 - 107 mmol/L Final    CO2 07/08/2024 31.0 (H)  22.0 - 29.0 mmol/L Final    Calcium 07/08/2024 9.2  8.6 - 10.5 mg/dL Final    Total Protein 07/08/2024 7.2  6.0 - 8.5 g/dL Final    Albumin 07/08/2024 4.4  3.5 - 5.2 g/dL Final    ALT (SGPT) 07/08/2024 12  1 - 41 U/L Final    AST (SGOT) 07/08/2024 17  1 - 40 U/L Final    Alkaline Phosphatase 07/08/2024 58  39 - 117 U/L Final    Total Bilirubin 07/08/2024 1.6 (H)  0.0 - 1.2 mg/dL Final    Globulin 07/08/2024 2.8  gm/dL Final    Calculated Result    A/G Ratio 07/08/2024 1.6  g/dL Final    BUN/Creatinine Ratio 07/08/2024 10.8  7.0 - 25.0 Final    Anion Gap 07/08/2024 7.0  5.0 - 15.0 mmol/L Final    eGFR 07/08/2024 82.4  >60.0 mL/min/1.73 Final    Lipase 07/08/2024 24  13 - 60 U/L Final    proBNP 07/08/2024 <36.0  0.0 - 450.0 pg/mL Final    Extra Tube 07/08/2024 Hold for add-ons.   Final    Auto resulted.    Extra Tube 07/08/2024 hold for add-on   Final    Auto resulted    Extra Tube 07/08/2024 Hold for add-ons.   Final    Auto resulted.    Extra Tube 07/08/2024 Hold for add-ons.   Final    Auto resulted.    Extra Tube 07/08/2024 Hold for add-ons.   Final    Auto resulted    WBC 07/08/2024 6.54  3.40 - 10.80 10*3/mm3 Final    RBC 07/08/2024 5.06  4.14 - 5.80 10*6/mm3 Final    Hemoglobin 07/08/2024 15.6  13.0 - 17.7 g/dL Final    Hematocrit 07/08/2024 45.0  37.5 - 51.0 % Final    MCV 07/08/2024 88.9  79.0 - 97.0 fL Final    MCH 07/08/2024 30.8  26.6 - 33.0 pg Final    MCHC 07/08/2024 34.7  31.5 - 35.7 g/dL Final     RDW 07/08/2024 12.5  12.3 - 15.4 % Final    RDW-SD 07/08/2024 41.0  37.0 - 54.0 fl Final    MPV 07/08/2024 12.4 (H)  6.0 - 12.0 fL Final    Platelets 07/08/2024 182  140 - 450 10*3/mm3 Final    Neutrophil % 07/08/2024 49.9  42.7 - 76.0 % Final    Lymphocyte % 07/08/2024 43.1  19.6 - 45.3 % Final    Monocyte % 07/08/2024 5.5  5.0 - 12.0 % Final    Eosinophil % 07/08/2024 1.1  0.3 - 6.2 % Final    Basophil % 07/08/2024 0.2  0.0 - 1.5 % Final    Immature Grans % 07/08/2024 0.2  0.0 - 0.5 % Final    Neutrophils, Absolute 07/08/2024 3.27  1.70 - 7.00 10*3/mm3 Final    Lymphocytes, Absolute 07/08/2024 2.82  0.70 - 3.10 10*3/mm3 Final    Monocytes, Absolute 07/08/2024 0.36  0.10 - 0.90 10*3/mm3 Final    Eosinophils, Absolute 07/08/2024 0.07  0.00 - 0.40 10*3/mm3 Final    Basophils, Absolute 07/08/2024 0.01  0.00 - 0.20 10*3/mm3 Final    Immature Grans, Absolute 07/08/2024 0.01  0.00 - 0.05 10*3/mm3 Final    nRBC 07/08/2024 0.0  0.0 - 0.2 /100 WBC Final    TSH 07/08/2024 1.380  0.270 - 4.200 uIU/mL Final    Magnesium 07/08/2024 2.0  1.6 - 2.6 mg/dL Final    D-Dimer, Quantitative 07/08/2024 <0.27  0.00 - 0.50 MCGFEU/mL Final    HS Troponin T 07/08/2024 <6  <22 ng/L Final    Specimen hemolyzed.  Results may be falsely decreased.    Troponin T Numeric Delta 07/08/2024    Final    Unable to calculate.     Results      Results review: During today's encounter, all relevant clinical data has been reviewed.      Assessment and Plan  Diagnoses and all orders for this visit:    1. Muscle pain, cervical (Primary)  -     ibuprofen (ADVIL,MOTRIN) 800 MG tablet; Take 1 tablet by mouth Every 8 (Eight) Hours As Needed for Mild Pain, Headache or Moderate Pain for up to 10 days.  Dispense: 30 tablet; Refill: 0  -     baclofen (LIORESAL) 10 MG tablet; Take 1/2 to 1 whole tab as needed for muscle spasm or pain up to three times per day (May cause drowsiness)  Dispense: 21 tablet; Refill: 0    2. Cervicalgia    3. Essential  hypertension    4. JANIE (generalized anxiety disorder)      Assessment & Plan  1-2. Cervicalgia.  The etiology of the discomfort appears to be muscular in nature, potentially exacerbated by poor posture and stress. There is a noticeable difference in muscle tone on the left side of his neck. He has been advised to incorporate gentle stretching exercises into his daily routine, particularly focusing on the neck and shoulders. He has also been cautioned against heavy lifting. A prescription for ibuprofen 800 mg has been provided, with instructions to take it three to four times daily. Additionally, a muscle relaxant has been prescribed for use at night. He has been encouraged to apply heat to the affected area for 20 minutes, three to four times daily. The potential benefits of massage therapy have also been discussed. Should there be no improvement in his condition within two to three weeks, he is advised to schedule a follow-up appointment.    3. Hypertension.  His blood pressure readings are slightly elevated today, but not critically so. He has been advised to maintain a log of his blood pressure readings over the next two weeks, taking measurements twice daily if possible. He has been encouraged to resume regular exercise and adopt a healthier diet, and follow up with PCP for management.    4. Anxiety.  He has been informed that treating his anxiety may also help manage his blood pressure. The use of hydroxyzine as needed for stress has been suggested. He reports quite a bit of stress, especially with his current job.               New Medications:   New Medications Ordered This Visit   Medications    ibuprofen (ADVIL,MOTRIN) 800 MG tablet     Sig: Take 1 tablet by mouth Every 8 (Eight) Hours As Needed for Mild Pain, Headache or Moderate Pain for up to 10 days.     Dispense:  30 tablet     Refill:  0    baclofen (LIORESAL) 10 MG tablet     Sig: Take 1/2 to 1 whole tab as needed for muscle spasm or pain up to three  times per day (May cause drowsiness)     Dispense:  21 tablet     Refill:  0       Discontinued Medications:   Medications Discontinued During This Encounter   Medication Reason    azithromycin (Zithromax Z-Ryan) 250 MG tablet *Therapy completed              Visit Diagnoses:    ICD-10-CM ICD-9-CM   1. Muscle pain, cervical  M54.2 723.1   2. Cervicalgia  M54.2 723.1   3. Essential hypertension  I10 401.9   4. JANIE (generalized anxiety disorder)  F41.1 300.02            Follow Up:   Return if symptoms worsen or fail to improve.    Patient was given instructions and counseling regarding his condition or for health maintenance advice. Please see specific information pulled into the AVS if appropriate.     Patient or patient representative verbalized consent for the use of Ambient Listening during the visit with  PIERCE Zuniga for chart documentation. 1/20/2025  23:59 EST      This document has been electronically signed by PIERCE Zuniga   January 20, 2025 23:59 EST    Dictated Utilizing Dragon Dictation: Part of this note may be an electronic transcription/translation of spoken language to printed text using the Dragon Dictation System.